# Patient Record
Sex: MALE | Race: WHITE | Employment: FULL TIME | URBAN - METROPOLITAN AREA
[De-identification: names, ages, dates, MRNs, and addresses within clinical notes are randomized per-mention and may not be internally consistent; named-entity substitution may affect disease eponyms.]

---

## 2017-02-02 RX ORDER — PAROXETINE 10 MG/1
TABLET, FILM COATED ORAL
Qty: 90 TABLET | Refills: 0 | Status: SHIPPED | OUTPATIENT
Start: 2017-02-02 | End: 2017-04-26

## 2017-02-02 RX ORDER — BENAZEPRIL HYDROCHLORIDE AND HYDROCHLOROTHIAZIDE 20; 12.5 MG/1; MG/1
TABLET ORAL
Qty: 90 TABLET | Refills: 0 | Status: SHIPPED | OUTPATIENT
Start: 2017-02-02 | End: 2017-06-14

## 2017-04-27 NOTE — TELEPHONE ENCOUNTER
Hypertensive Medications  Protocol Criteria:  · Appointment scheduled in the past 6 months or in the next 3 months  · BMP or CMP in the past 12 months  · Creatinine result < 2  Recent Visits       Provider Department Primary Dx    8 months ago Annelise Khan

## 2017-05-02 RX ORDER — PAROXETINE 10 MG/1
TABLET, FILM COATED ORAL
Qty: 30 TABLET | Refills: 0 | Status: SHIPPED | OUTPATIENT
Start: 2017-05-02 | End: 2017-06-14

## 2017-05-02 RX ORDER — BENAZEPRIL HYDROCHLORIDE AND HYDROCHLOROTHIAZIDE 20; 12.5 MG/1; MG/1
TABLET ORAL
Qty: 30 TABLET | Refills: 0 | Status: SHIPPED | OUTPATIENT
Start: 2017-05-02 | End: 2017-06-14

## 2017-06-14 ENCOUNTER — OFFICE VISIT (OUTPATIENT)
Dept: FAMILY MEDICINE CLINIC | Facility: CLINIC | Age: 42
End: 2017-06-14

## 2017-06-14 VITALS
HEART RATE: 109 BPM | TEMPERATURE: 98 F | BODY MASS INDEX: 35.79 KG/M2 | WEIGHT: 250 LBS | DIASTOLIC BLOOD PRESSURE: 84 MMHG | HEIGHT: 70 IN | SYSTOLIC BLOOD PRESSURE: 134 MMHG | RESPIRATION RATE: 20 BRPM

## 2017-06-14 DIAGNOSIS — F41.9 ANXIETY: ICD-10-CM

## 2017-06-14 DIAGNOSIS — I10 ESSENTIAL HYPERTENSION: ICD-10-CM

## 2017-06-14 PROCEDURE — 99213 OFFICE O/P EST LOW 20 MIN: CPT | Performed by: FAMILY MEDICINE

## 2017-06-14 PROCEDURE — 99212 OFFICE O/P EST SF 10 MIN: CPT | Performed by: FAMILY MEDICINE

## 2017-06-14 RX ORDER — DEXTROAMPHETAMINE SACCHARATE, AMPHETAMINE ASPARTATE, DEXTROAMPHETAMINE SULFATE AND AMPHETAMINE SULFATE 5; 5; 5; 5 MG/1; MG/1; MG/1; MG/1
20 TABLET ORAL DAILY
Qty: 30 TABLET | Refills: 0 | Status: SHIPPED | OUTPATIENT
Start: 2017-06-14 | End: 2018-09-10

## 2017-06-14 RX ORDER — PAROXETINE 10 MG/1
TABLET, FILM COATED ORAL
Qty: 30 TABLET | Refills: 0 | Status: SHIPPED | OUTPATIENT
Start: 2017-06-14 | End: 2017-06-14

## 2017-06-14 RX ORDER — PAROXETINE 10 MG/1
TABLET, FILM COATED ORAL
Qty: 90 TABLET | Refills: 3 | Status: SHIPPED | OUTPATIENT
Start: 2017-06-14 | End: 2018-06-18

## 2017-06-14 RX ORDER — BENAZEPRIL HYDROCHLORIDE AND HYDROCHLOROTHIAZIDE 20; 12.5 MG/1; MG/1
1 TABLET ORAL
Qty: 90 TABLET | Refills: 3 | Status: SHIPPED | OUTPATIENT
Start: 2017-06-14 | End: 2018-09-10

## 2017-06-14 RX ORDER — BENAZEPRIL HYDROCHLORIDE AND HYDROCHLOROTHIAZIDE 20; 12.5 MG/1; MG/1
TABLET ORAL
Qty: 90 TABLET | Refills: 0 | Status: SHIPPED | OUTPATIENT
Start: 2017-06-14 | End: 2018-08-23

## 2017-06-14 NOTE — PROGRESS NOTES
HPI:    Patient ID: Margo Singh is a 40year old male. HPI Comments: Patient is here for follow up for chronic medical issues- anxiety and HTN. The patient is compliant with medications and no side effects.  There are no acute issues and patient is re Vitals reviewed. ASSESSMENT/PLAN:   Essential hypertension:  - Stable, Will check lab work as below; Medication reviewed and renewed. Follow up and further management after testing. Routine follow up in 6-12 months or as needed.      Anxiety:

## 2018-06-14 ENCOUNTER — PATIENT OUTREACH (OUTPATIENT)
Dept: CASE MANAGEMENT | Age: 43
End: 2018-06-14

## 2018-06-18 RX ORDER — PAROXETINE 10 MG/1
TABLET, FILM COATED ORAL
Qty: 90 TABLET | Refills: 0 | Status: SHIPPED | OUTPATIENT
Start: 2018-06-18 | End: 2018-09-06

## 2018-06-26 RX ORDER — PAROXETINE 10 MG/1
TABLET, FILM COATED ORAL
Qty: 30 TABLET | Refills: 0 | OUTPATIENT
Start: 2018-06-26

## 2018-08-10 ENCOUNTER — PATIENT OUTREACH (OUTPATIENT)
Dept: CASE MANAGEMENT | Age: 43
End: 2018-08-10

## 2018-08-10 NOTE — PROGRESS NOTES
Second outreach to patient in regards to scheduling his/her HTN F/U appt. Left message to call back ext. [phone]. Thank you.

## 2018-08-25 RX ORDER — BENAZEPRIL HYDROCHLORIDE AND HYDROCHLOROTHIAZIDE 20; 12.5 MG/1; MG/1
TABLET ORAL
Qty: 90 TABLET | Refills: 0 | Status: SHIPPED | OUTPATIENT
Start: 2018-08-25 | End: 2018-09-10

## 2018-08-27 NOTE — PROGRESS NOTES
Patient due for his HTN F/U visit with his PCP. After multiple attempts to reach patient by phone a letter was sent to patient requesting a call back to discuss.

## 2018-08-29 RX ORDER — BENAZEPRIL HYDROCHLORIDE AND HYDROCHLOROTHIAZIDE 20; 12.5 MG/1; MG/1
TABLET ORAL
Qty: 90 TABLET | Refills: 1 | OUTPATIENT
Start: 2018-08-29

## 2018-08-30 NOTE — TELEPHONE ENCOUNTER
CSS, please contact patient and assist in scheduling overdue f/u or Px (LOV= 6/14/17). [facility] had already sent refill on 8/25/18 but pt needs to be seen before further refills are given. MD Nilton Sutton Angelique, RN; [facility] 2 days ago     He needs an apt for any refills.  May get 10 tablets or so max (Routing comment)

## 2018-09-06 RX ORDER — PAROXETINE 10 MG/1
TABLET, FILM COATED ORAL
Qty: 90 TABLET | Refills: 0 | Status: SHIPPED | OUTPATIENT
Start: 2018-09-06 | End: 2018-09-10

## 2018-09-06 NOTE — TELEPHONE ENCOUNTER
Refill Protocol Appointment Criteria  · Appointment scheduled in the past 6 months or in the next 3 months  Recent Outpatient Visits            1 year ago Essential hypertension    Marah Mchugh, [facility]

## 2018-09-06 NOTE — TELEPHONE ENCOUNTER
Per pt, he has an appt already with [facility] but he is out of med,  Pt needs refill on his PAROXETINE HCL 10 MG Oral Tab and his pharmacy wouldn't want to give him any emergency refill,  Pls advise.       Current Outpatient Prescriptions:        PAROXETINE HCL 1

## 2018-09-10 ENCOUNTER — OFFICE VISIT (OUTPATIENT)
Dept: FAMILY MEDICINE CLINIC | Facility: CLINIC | Age: 43
End: 2018-09-10

## 2018-09-10 VITALS
SYSTOLIC BLOOD PRESSURE: 122 MMHG | RESPIRATION RATE: 18 BRPM | BODY MASS INDEX: 34.65 KG/M2 | HEIGHT: 70 IN | WEIGHT: 242 LBS | TEMPERATURE: 98 F | DIASTOLIC BLOOD PRESSURE: 81 MMHG | HEART RATE: 83 BPM

## 2018-09-10 DIAGNOSIS — I10 ESSENTIAL HYPERTENSION: Primary | ICD-10-CM

## 2018-09-10 DIAGNOSIS — F98.8 ATTENTION DEFICIT DISORDER, UNSPECIFIED HYPERACTIVITY PRESENCE: ICD-10-CM

## 2018-09-10 DIAGNOSIS — F41.1 ANXIETY STATE: ICD-10-CM

## 2018-09-10 PROCEDURE — 99212 OFFICE O/P EST SF 10 MIN: CPT | Performed by: FAMILY MEDICINE

## 2018-09-10 PROCEDURE — 99214 OFFICE O/P EST MOD 30 MIN: CPT | Performed by: FAMILY MEDICINE

## 2018-09-10 RX ORDER — BENAZEPRIL HYDROCHLORIDE AND HYDROCHLOROTHIAZIDE 20; 12.5 MG/1; MG/1
1 TABLET ORAL
Qty: 90 TABLET | Refills: 3 | Status: SHIPPED | OUTPATIENT
Start: 2018-09-10 | End: 2019-10-08

## 2018-09-10 RX ORDER — DEXTROAMPHETAMINE SACCHARATE, AMPHETAMINE ASPARTATE, DEXTROAMPHETAMINE SULFATE AND AMPHETAMINE SULFATE 5; 5; 5; 5 MG/1; MG/1; MG/1; MG/1
20 TABLET ORAL DAILY
Qty: 30 TABLET | Refills: 0 | Status: SHIPPED | OUTPATIENT
Start: 2018-09-10 | End: 2019-01-10

## 2018-09-10 RX ORDER — PAROXETINE HYDROCHLORIDE 20 MG/1
20 TABLET, FILM COATED ORAL DAILY
Qty: 90 TABLET | Refills: 3 | Status: SHIPPED | OUTPATIENT
Start: 2018-09-10 | End: 2019-10-05

## 2018-09-10 NOTE — PROGRESS NOTES
HPI:    Patient ID: Remigio Herrera is a 44year old male. Patient is here for follow up for chronic medical issues- HTN, ADD, and anxiety. The patient is compliant with medications and no side effects.  There are no acute issues and patient is requesting management after testing. To monitor blood pressure; To call if any persistent elevation of blood pressure; Discussed good diet/activity; Routine follow up in 6-12 months or as needed.      Attention deficit disorder, unspecified hyperactivity presence:  -

## 2018-10-11 NOTE — TELEPHONE ENCOUNTER
Pt is requesting refill         Current Outpatient Medications:  amphetamine-dextroamphetamine (ADDERALL) 20 MG Oral Tab Take 1 tablet (20 mg total) by mouth daily.  Disp: 30 tablet Rfl: 0

## 2018-10-12 RX ORDER — DEXTROAMPHETAMINE SACCHARATE, AMPHETAMINE ASPARTATE, DEXTROAMPHETAMINE SULFATE AND AMPHETAMINE SULFATE 5; 5; 5; 5 MG/1; MG/1; MG/1; MG/1
20 TABLET ORAL DAILY
Qty: 30 TABLET | Refills: 0 | Status: SHIPPED | OUTPATIENT
Start: 2018-11-10 | End: 2018-12-10

## 2018-10-12 RX ORDER — DEXTROAMPHETAMINE SACCHARATE, AMPHETAMINE ASPARTATE, DEXTROAMPHETAMINE SULFATE AND AMPHETAMINE SULFATE 5; 5; 5; 5 MG/1; MG/1; MG/1; MG/1
20 TABLET ORAL DAILY
Qty: 30 TABLET | Refills: 0 | Status: SHIPPED | OUTPATIENT
Start: 2018-10-12 | End: 2018-11-11

## 2018-10-12 RX ORDER — DEXTROAMPHETAMINE SACCHARATE, AMPHETAMINE ASPARTATE, DEXTROAMPHETAMINE SULFATE AND AMPHETAMINE SULFATE 5; 5; 5; 5 MG/1; MG/1; MG/1; MG/1
20 TABLET ORAL DAILY
Qty: 30 TABLET | Refills: 0 | Status: SHIPPED | OUTPATIENT
Start: 2018-12-10 | End: 2019-01-09

## 2018-10-12 NOTE — TELEPHONE ENCOUNTER
Message noted: Chart reviewed and may refill medication as requested times 3. Script printed and left at  here at [place]. Please notify patient.

## 2018-10-12 NOTE — TELEPHONE ENCOUNTER
adderall refill request LR 9/10/18  #30      Recent Outpatient Visits            1 month ago Essential hypertension    Gabbi Singer MD    Office Visit    1 year ago Essential hypertension    [facility]

## 2018-11-12 NOTE — TELEPHONE ENCOUNTER
Pt would like a refill on adderall 20 milligrams medication. Please, call pt when the script is ready for . Current Outpatient Medications:  amphetamine-dextroamphetamine (ADDERALL) 20 MG Oral Tab Take 1 tablet (20 mg total) by mouth daily.  Maurisio Reyez

## 2018-11-13 RX ORDER — DEXTROAMPHETAMINE SACCHARATE, AMPHETAMINE ASPARTATE, DEXTROAMPHETAMINE SULFATE AND AMPHETAMINE SULFATE 5; 5; 5; 5 MG/1; MG/1; MG/1; MG/1
20 TABLET ORAL DAILY
Qty: 30 TABLET | Refills: 0 | OUTPATIENT
Start: 2018-11-13

## 2018-11-13 RX ORDER — DEXTROAMPHETAMINE SACCHARATE, AMPHETAMINE ASPARTATE, DEXTROAMPHETAMINE SULFATE AND AMPHETAMINE SULFATE 5; 5; 5; 5 MG/1; MG/1; MG/1; MG/1
20 TABLET ORAL DAILY
Qty: 30 TABLET | Refills: 0 | OUTPATIENT
Start: 2018-11-13 | End: 2018-12-13

## 2018-11-13 RX ORDER — DEXTROAMPHETAMINE SACCHARATE, AMPHETAMINE ASPARTATE, DEXTROAMPHETAMINE SULFATE AND AMPHETAMINE SULFATE 5; 5; 5; 5 MG/1; MG/1; MG/1; MG/1
20 TABLET ORAL DAILY
Qty: 30 TABLET | Refills: 0 | OUTPATIENT
Start: 2018-12-10 | End: 2019-01-09

## 2018-11-14 NOTE — TELEPHONE ENCOUNTER
Controlled medication pending for review. If approved needs to be called in or faxed by on-site staff.      Requested Prescriptions     Pending Prescriptions Disp Refills   • amphetamine-dextroamphetamine (ADDERALL) 20 MG Oral Tab 30 tablet 0     Sig: Take

## 2018-11-14 NOTE — TELEPHONE ENCOUNTER
Notified patient 3 scripts were given to his wife who picked up medication 9/10. Patient verbalized understanding.

## 2018-11-25 RX ORDER — BENAZEPRIL HYDROCHLORIDE AND HYDROCHLOROTHIAZIDE 20; 12.5 MG/1; MG/1
TABLET ORAL
Qty: 90 TABLET | Refills: 0 | OUTPATIENT
Start: 2018-11-25

## 2018-11-25 NOTE — TELEPHONE ENCOUNTER
RN=please call patient, last refilled on 9/10/18 and good for a year but it was esent to [facility] but the pharmacy requesting now is [facility], if patient changed her pharmacy, tell her to transfer the prescription.

## 2018-11-26 RX ORDER — BENAZEPRIL HYDROCHLORIDE AND HYDROCHLOROTHIAZIDE 20; 12.5 MG/1; MG/1
TABLET ORAL
Qty: 90 TABLET | Refills: 3 | Status: SHIPPED | OUTPATIENT
Start: 2018-11-26 | End: 2019-10-08

## 2018-11-26 NOTE — TELEPHONE ENCOUNTER
Please review; protocol failed.     Hypertensive Medications  Protocol Criteria:  · Appointment scheduled in the past 6 months or in the next 3 months  · BMP or CMP in the past 12 months  · Creatinine result < 2  Recent Outpatient Visits            2 months

## 2018-11-26 NOTE — TELEPHONE ENCOUNTER
BENAZEPRIL-HYDROCHLOROTHIAZIDE 20-12.5 MG Oral Tab 90 tablet 3 11/26/2018     Sig: TAKE 1 TABLET BY MOUTH EVERY DAY    Sent to pharmacy as: BENAZEPRIL-HYDROCHLOROTHIAZIDE 20-12.5 MG Oral Tab    E-Prescribing Status: Receipt confirmed by pharmacy (11/26/2

## 2019-01-10 RX ORDER — DEXTROAMPHETAMINE SACCHARATE, AMPHETAMINE ASPARTATE, DEXTROAMPHETAMINE SULFATE AND AMPHETAMINE SULFATE 5; 5; 5; 5 MG/1; MG/1; MG/1; MG/1
20 TABLET ORAL DAILY
Qty: 30 TABLET | Refills: 0 | Status: SHIPPED | OUTPATIENT
Start: 2019-01-10 | End: 2019-02-13

## 2019-01-10 NOTE — TELEPHONE ENCOUNTER
Pt is requesting refill . Asking for 3 months. amphetamine-dextroamphetamine (ADDERALL) 20 MG Oral Tab Take 1 tablet (20 mg total) by mouth daily.  Disp: 30 tablet Rfl: 0

## 2019-01-10 NOTE — TELEPHONE ENCOUNTER
Message noted: Chart reviewed and may refill medication as requested times one. Script printed and left at  here at [facility]. Please notify patient.

## 2019-01-10 NOTE — TELEPHONE ENCOUNTER
LR 9/10/18  LOV 9/10/18      Recent Visits  Date Type Provider Dept   09/10/18 Office Visit Wai Montague MD [facility]   Showing recent visits within past 540 days with a meds authorizing provider and meeting all other requirements     Future Appo

## 2019-02-07 ENCOUNTER — NURSE TRIAGE (OUTPATIENT)
Dept: OTHER | Age: 44
End: 2019-02-07

## 2019-02-07 NOTE — TELEPHONE ENCOUNTER
Action Requested: Summary for Provider     []  Critical Lab, Recommendations Needed  [] Need Additional Advice  []   FYI    []   Need Orders  [] Need Medications Sent to Pharmacy  []  Other     SUMMARY: sent to IC. Please note DR Ojeda.      Reason for call:

## 2019-02-07 NOTE — TELEPHONE ENCOUNTER
Message noted. If sig symptoms should go to ER if unable to go to immediate care- otherwise can see if someone has appointment in am to see patient.

## 2019-02-07 NOTE — TELEPHONE ENCOUNTER
Spouse calling in (present with patient), patient is not wanting to go to IC not covered under insurance, concerned with the costs, as well as costs with going to ED.  Spouse reports patient using otc pain medication, icing and resting, patient reports he i

## 2019-02-08 ENCOUNTER — HOSPITAL ENCOUNTER (OUTPATIENT)
Dept: GENERAL RADIOLOGY | Facility: HOSPITAL | Age: 44
Discharge: HOME | End: 2019-02-08
Attending: FAMILY MEDICINE
Payer: COMMERCIAL

## 2019-02-08 ENCOUNTER — OFFICE VISIT (OUTPATIENT)
Dept: FAMILY MEDICINE CLINIC | Facility: CLINIC | Age: 44
End: 2019-02-08

## 2019-02-08 VITALS
SYSTOLIC BLOOD PRESSURE: 137 MMHG | WEIGHT: 242 LBS | DIASTOLIC BLOOD PRESSURE: 90 MMHG | HEIGHT: 70 IN | HEART RATE: 79 BPM | BODY MASS INDEX: 34.65 KG/M2 | TEMPERATURE: 98 F

## 2019-02-08 DIAGNOSIS — M25.561 RIGHT KNEE PAIN, UNSPECIFIED CHRONICITY: Primary | ICD-10-CM

## 2019-02-08 DIAGNOSIS — M25.561 RIGHT KNEE PAIN, UNSPECIFIED CHRONICITY: ICD-10-CM

## 2019-02-08 PROCEDURE — 73562 X-RAY EXAM OF KNEE 3: CPT | Performed by: FAMILY MEDICINE

## 2019-02-08 PROCEDURE — 99213 OFFICE O/P EST LOW 20 MIN: CPT | Performed by: FAMILY MEDICINE

## 2019-02-08 PROCEDURE — 99212 OFFICE O/P EST SF 10 MIN: CPT | Performed by: FAMILY MEDICINE

## 2019-02-08 RX ORDER — TRAMADOL HYDROCHLORIDE 50 MG/1
50 TABLET ORAL EVERY 6 HOURS PRN
Qty: 30 TABLET | Refills: 0 | Status: SHIPPED | OUTPATIENT
Start: 2019-02-08 | End: 2019-07-10

## 2019-02-08 NOTE — PROGRESS NOTES
HPI:    Patient ID: Ryan Leon is a 38year old male. Patient with severe right knee pain since few days ago.  He bent the knee to help his son, did not have severe pain at the moment but now can not walk and can not bend knee at all        Review of

## 2019-02-11 ENCOUNTER — OFFICE VISIT (OUTPATIENT)
Dept: ORTHOPEDICS CLINIC | Facility: CLINIC | Age: 44
End: 2019-02-11

## 2019-02-11 ENCOUNTER — TELEPHONE (OUTPATIENT)
Dept: ORTHOPEDICS CLINIC | Facility: CLINIC | Age: 44
End: 2019-02-11

## 2019-02-11 DIAGNOSIS — M23.91 INTERNAL DERANGEMENT OF RIGHT KNEE: Primary | ICD-10-CM

## 2019-02-11 PROCEDURE — 99203 OFFICE O/P NEW LOW 30 MIN: CPT | Performed by: ORTHOPAEDIC SURGERY

## 2019-02-11 PROCEDURE — 99212 OFFICE O/P EST SF 10 MIN: CPT | Performed by: ORTHOPAEDIC SURGERY

## 2019-02-11 PROCEDURE — L1830 KO IMMOB CANVAS LONG PRE OTS: HCPCS | Performed by: ORTHOPAEDIC SURGERY

## 2019-02-11 NOTE — PROGRESS NOTES
NURSING INTAKE COMMENTS: Patient presents with:  Consult: New pt, injured right knee afting lifting son. x2weeks. Rates pain 8/10 at this time. Denies any numnbess or tingling.   Xray done 2/8/19       HPI: This 38year old male presents today with complain Problem Relation Age of Onset   • Prostate Cancer Other    • Hypertension Maternal Grandfather    • Heart Disease Maternal Grandfather        Social History    Socioeconomic History      Marital status:       Spouse name: Not on file      Number o distress but cannot bend his knee. His right knee has no warmth erythema he is tender by the anterior aspect more by the quad tendon than elsewhere but I do not appreciate an obvious effusion.   He can flex no better than 70 degrees anterior posterior Lach

## 2019-02-11 NOTE — TELEPHONE ENCOUNTER
Need order for Knee Immobilizer for R Knee - CPT CODE Y6224T8 - fax to [address] [place] [address] - fax # [phone].

## 2019-02-13 NOTE — TELEPHONE ENCOUNTER
Pt is requesting refill. amphetamine-dextroamphetamine (ADDERALL) 20 MG Oral Tab Take 1 tablet (20 mg total) by mouth daily.  Disp: 30 tablet Rfl: 0

## 2019-02-14 RX ORDER — DEXTROAMPHETAMINE SACCHARATE, AMPHETAMINE ASPARTATE, DEXTROAMPHETAMINE SULFATE AND AMPHETAMINE SULFATE 5; 5; 5; 5 MG/1; MG/1; MG/1; MG/1
20 TABLET ORAL DAILY
Qty: 30 TABLET | Refills: 0 | Status: SHIPPED | OUTPATIENT
Start: 2019-02-14 | End: 2019-03-21

## 2019-02-14 NOTE — TELEPHONE ENCOUNTER
Message noted: Chart reviewed and may refill medication as requested times one. Script printed and left at  here at [place]. Please notify patient.

## 2019-02-15 ENCOUNTER — HOSPITAL ENCOUNTER (OUTPATIENT)
Dept: MRI IMAGING | Age: 44
Discharge: HOME | End: 2019-02-15
Attending: ORTHOPAEDIC SURGERY
Payer: COMMERCIAL

## 2019-02-15 DIAGNOSIS — M23.91 INTERNAL DERANGEMENT OF RIGHT KNEE: ICD-10-CM

## 2019-02-15 PROCEDURE — 73721 MRI JNT OF LWR EXTRE W/O DYE: CPT | Performed by: ORTHOPAEDIC SURGERY

## 2019-02-18 ENCOUNTER — OFFICE VISIT (OUTPATIENT)
Dept: ORTHOPEDICS CLINIC | Facility: CLINIC | Age: 44
End: 2019-02-18

## 2019-02-18 DIAGNOSIS — S76.111A STRAIN OF QUADRICEPS TENDON, RIGHT, INITIAL ENCOUNTER: Primary | ICD-10-CM

## 2019-02-18 PROCEDURE — 99212 OFFICE O/P EST SF 10 MIN: CPT | Performed by: ORTHOPAEDIC SURGERY

## 2019-02-18 PROCEDURE — 99213 OFFICE O/P EST LOW 20 MIN: CPT | Performed by: ORTHOPAEDIC SURGERY

## 2019-02-18 NOTE — PROGRESS NOTES
NURSING INTAKE COMMENTS: Patient presents with:  Knee Pain: f/u on Right knee MRI. Per pt states pain has improved. Rates pain 3/10.       HPI: This 38year old male presents today with complaints of anterior right knee discomfort that is better than when I Take 1 tablet (20 mg total) by mouth daily. Disp: 90 tablet Rfl: 3   Benazepril-Hydrochlorothiazide 20-12.5 MG Oral Tab Take 1 tablet by mouth once daily. Disp: 90 tablet Rfl: 3     No current facility-administered medications on file prior to visit.      Tegan Ventura coffee, 2 cups daily        Occupational Exposure: Not Asked        Hobby Hazards: Not Asked        Sleep Concern: Not Asked        Stress Concern: Not Asked        Weight Concern: Not Asked        Special Diet: Not Asked        Back Care: Not Asked

## 2019-02-20 ENCOUNTER — TELEPHONE (OUTPATIENT)
Dept: FAMILY MEDICINE CLINIC | Facility: CLINIC | Age: 44
End: 2019-02-20

## 2019-03-11 ENCOUNTER — OFFICE VISIT (OUTPATIENT)
Dept: ORTHOPEDICS CLINIC | Facility: CLINIC | Age: 44
End: 2019-03-11

## 2019-03-11 VITALS — BODY MASS INDEX: 32 KG/M2 | WEIGHT: 220 LBS

## 2019-03-11 DIAGNOSIS — S76.111A STRAIN OF QUADRICEPS TENDON, RIGHT, INITIAL ENCOUNTER: Primary | ICD-10-CM

## 2019-03-11 PROCEDURE — 99212 OFFICE O/P EST SF 10 MIN: CPT | Performed by: ORTHOPAEDIC SURGERY

## 2019-03-11 NOTE — PROGRESS NOTES
Patient presents for follow-up doing dramatically better he denies any significant pain. He has been wearing the knee immobilizer for 3 weeks and there is no pain.   This individual had a small incomplete partial tear on the anterior most aspect of the reid

## 2019-03-22 NOTE — TELEPHONE ENCOUNTER
No Protocol on this med. Controlled medication pending for review. If approved pls call pt for p/u. Donovan Zamora thanks       Requested Prescriptions     Pending Prescriptions Disp Refills   • amphetamine-dextroamphetamine (ADDERALL) 20 MG Oral Tab 30 tablet 0

## 2019-03-23 RX ORDER — DEXTROAMPHETAMINE SACCHARATE, AMPHETAMINE ASPARTATE, DEXTROAMPHETAMINE SULFATE AND AMPHETAMINE SULFATE 5; 5; 5; 5 MG/1; MG/1; MG/1; MG/1
20 TABLET ORAL DAILY
Qty: 30 TABLET | Refills: 0 | Status: SHIPPED | OUTPATIENT
Start: 2019-03-23 | End: 2019-05-29

## 2019-04-15 ENCOUNTER — OFFICE VISIT (OUTPATIENT)
Dept: ORTHOPEDICS CLINIC | Facility: CLINIC | Age: 44
End: 2019-04-15

## 2019-04-15 DIAGNOSIS — S76.111A STRAIN OF QUADRICEPS TENDON, RIGHT, INITIAL ENCOUNTER: Primary | ICD-10-CM

## 2019-04-15 PROCEDURE — 99212 OFFICE O/P EST SF 10 MIN: CPT | Performed by: ORTHOPAEDIC SURGERY

## 2019-04-15 PROCEDURE — 99213 OFFICE O/P EST LOW 20 MIN: CPT | Performed by: ORTHOPAEDIC SURGERY

## 2019-04-15 NOTE — PROGRESS NOTES
NURSING INTAKE COMMENTS: Patient presents with: Follow - Up: right knee- pt states he feels 99% better. HPI: This 38year old male presents today with complaints of no knee pain at the present time. He states he is 98% better.   He wore the immobili insecurity:        Worry: Not on file        Inability: Not on file      Transportation needs:        Medical: Not on file        Non-medical: Not on file    Tobacco Use      Smoking status: Never Smoker      Smokeless tobacco: Never Used    Substance and reviewed     Physical Exam: He is alert oriented well-groomed oriented x3 no obvious acute distress he walks with a normal gait.   His right quadriceps has no atrophy he has no tenderness at the quad tendon he can do a straight leg raise without discomfort

## 2019-04-24 RX ORDER — DEXTROAMPHETAMINE SACCHARATE, AMPHETAMINE ASPARTATE, DEXTROAMPHETAMINE SULFATE AND AMPHETAMINE SULFATE 5; 5; 5; 5 MG/1; MG/1; MG/1; MG/1
20 TABLET ORAL DAILY
Qty: 30 TABLET | Refills: 0 | Status: CANCELLED | OUTPATIENT
Start: 2019-06-23 | End: 2019-07-23

## 2019-04-24 RX ORDER — DEXTROAMPHETAMINE SACCHARATE, AMPHETAMINE ASPARTATE, DEXTROAMPHETAMINE SULFATE AND AMPHETAMINE SULFATE 5; 5; 5; 5 MG/1; MG/1; MG/1; MG/1
20 TABLET ORAL DAILY
Qty: 30 TABLET | Refills: 0 | Status: SHIPPED | OUTPATIENT
Start: 2019-04-24 | End: 2019-05-24

## 2019-04-24 RX ORDER — DEXTROAMPHETAMINE SACCHARATE, AMPHETAMINE ASPARTATE, DEXTROAMPHETAMINE SULFATE AND AMPHETAMINE SULFATE 5; 5; 5; 5 MG/1; MG/1; MG/1; MG/1
20 TABLET ORAL DAILY
Qty: 30 TABLET | Refills: 0 | Status: CANCELLED | OUTPATIENT
Start: 2019-05-24 | End: 2019-06-23

## 2019-04-24 NOTE — TELEPHONE ENCOUNTER
Message noted: Chart reviewed and may refill medication as requested times one. Script printed and left at  here at [place]. Please notify patient.

## 2019-04-24 NOTE — TELEPHONE ENCOUNTER
Patient requesting refill for     amphetamine-dextroamphetamine (ADDERALL) 20 MG Oral Tab Take 1 tablet (20 mg total) by mouth daily. Disp: 30 tablet Rfl: 0     Patient is out of medication.

## 2019-04-24 NOTE — TELEPHONE ENCOUNTER
Controlled medication pending for review. If approved needs to be called in or faxed by on-site staff.     Last Rx: 3/23/19  LOV: 2/8/19

## 2019-05-28 NOTE — TELEPHONE ENCOUNTER
Pt is requesting refill      amphetamine-dextroamphetamine (ADDERALL) 20 MG Oral Tab Take 1 tablet (20 mg total) by mouth daily.  Disp: 30 tablet Rfl: 0

## 2019-05-30 RX ORDER — DEXTROAMPHETAMINE SACCHARATE, AMPHETAMINE ASPARTATE, DEXTROAMPHETAMINE SULFATE AND AMPHETAMINE SULFATE 5; 5; 5; 5 MG/1; MG/1; MG/1; MG/1
20 TABLET ORAL DAILY
Qty: 30 TABLET | Refills: 0 | Status: SHIPPED | OUTPATIENT
Start: 2019-05-30 | End: 2019-07-01

## 2019-05-30 NOTE — TELEPHONE ENCOUNTER
Controlled medication pending for review. If approved needs to be called in or faxed by on-site staff.     Last Rx: 3/23/19  LOV: 9/10/18    Requested Prescriptions   Pending Prescriptions Disp Refills   • amphetamine-dextroamphetamine (ADDERALL) 20 MG Ora

## 2019-05-30 NOTE — TELEPHONE ENCOUNTER
Message noted: Chart reviewed and may refill medication as requested times one. Script printed and left at  here at [facility]. Please notify patient. [place]. No concerns noted.

## 2019-07-01 RX ORDER — DEXTROAMPHETAMINE SACCHARATE, AMPHETAMINE ASPARTATE, DEXTROAMPHETAMINE SULFATE AND AMPHETAMINE SULFATE 5; 5; 5; 5 MG/1; MG/1; MG/1; MG/1
20 TABLET ORAL DAILY
Qty: 30 TABLET | Refills: 0 | Status: SHIPPED | OUTPATIENT
Start: 2019-07-01 | End: 2019-07-31

## 2019-07-01 NOTE — TELEPHONE ENCOUNTER
Call was placed to patient in regards to Rx refill.  Patient was instructed that he could  RX at the  of the [facility]

## 2019-07-01 NOTE — TELEPHONE ENCOUNTER
Pt states he need a script for medication      Current Outpatient Medications:  amphetamine-dextroamphetamine (ADDERALL) 20 MG Oral Tab Take 1 tablet (20 mg total) by mouth daily. Disp: 30 tablet Rfl: 0       Per pt he is out of medication.      Please  Adv

## 2019-07-12 NOTE — TELEPHONE ENCOUNTER
Controlled medication pending for review. If approved needs to be called in or faxed by on-site staff.     Last Rx: 2/8/19  LOV: 2/8/19

## 2019-07-13 RX ORDER — TRAMADOL HYDROCHLORIDE 50 MG/1
TABLET ORAL
Qty: 30 TABLET | Refills: 0 | OUTPATIENT
Start: 2019-07-13

## 2019-07-31 RX ORDER — DEXTROAMPHETAMINE SACCHARATE, AMPHETAMINE ASPARTATE, DEXTROAMPHETAMINE SULFATE AND AMPHETAMINE SULFATE 5; 5; 5; 5 MG/1; MG/1; MG/1; MG/1
20 TABLET ORAL DAILY
Qty: 30 TABLET | Refills: 0 | Status: SHIPPED | OUTPATIENT
Start: 2019-07-31 | End: 2019-08-30

## 2019-07-31 NOTE — TELEPHONE ENCOUNTER
Pt called in requesting a refill for the following medication:  Pt stated that he will be leaving [facility] on 8/2 and is requesting to have the Rx ready as soon as possible.   Please advise       Current Outpatient Medications:     •  amphetamine-dextroamphetam

## 2019-07-31 NOTE — TELEPHONE ENCOUNTER
· Last OV with Dr. Austin Garcia 9/2018 for ADD. Please advise on refill. Thanks.   Recent Outpatient Visits            3 months ago Strain of quadriceps tendon, right, initial encounter    [facility] for Raul Cannon Neila Covert, MD

## 2019-08-30 NOTE — TELEPHONE ENCOUNTER
Pt calling to request refill of medication amphetamine-dextroamphetamine (ADDERALL) 20 MG Oral Tab. He states he is out of the medication.

## 2019-08-31 RX ORDER — DEXTROAMPHETAMINE SACCHARATE, AMPHETAMINE ASPARTATE, DEXTROAMPHETAMINE SULFATE AND AMPHETAMINE SULFATE 5; 5; 5; 5 MG/1; MG/1; MG/1; MG/1
20 TABLET ORAL DAILY
Qty: 30 TABLET | Refills: 0 | Status: SHIPPED | OUTPATIENT
Start: 2019-08-31 | End: 2019-10-02

## 2019-08-31 NOTE — TELEPHONE ENCOUNTER
Message noted. Chart reviewed and may refill medication as requested. Will ask VELMA Flores to refill/ print as controlled substance as I am not in office today.

## 2019-08-31 NOTE — TELEPHONE ENCOUNTER
Message noted. Will refill medication as per Dr. Sanchez Duong instructions. Prescription was printed, signed and handed to front staff to inform pt that prescription is ready.

## 2019-08-31 NOTE — TELEPHONE ENCOUNTER
Controlled medications pending for review. If approved needs to be called in or faxed by on site staff.     Last Rx: 7-31-19 # 30  LOV: 9-10-18    Requested Prescriptions     Pending Prescriptions Disp Refills   • amphetamine-dextroamphetamine (ADDERALL) 20

## 2019-10-03 ENCOUNTER — PATIENT MESSAGE (OUTPATIENT)
Dept: FAMILY MEDICINE CLINIC | Facility: CLINIC | Age: 44
End: 2019-10-03

## 2019-10-03 RX ORDER — DEXTROAMPHETAMINE SACCHARATE, AMPHETAMINE ASPARTATE, DEXTROAMPHETAMINE SULFATE AND AMPHETAMINE SULFATE 5; 5; 5; 5 MG/1; MG/1; MG/1; MG/1
20 TABLET ORAL DAILY
Qty: 30 TABLET | Refills: 0 | OUTPATIENT
Start: 2019-10-03

## 2019-10-03 RX ORDER — DEXTROAMPHETAMINE SACCHARATE, AMPHETAMINE ASPARTATE, DEXTROAMPHETAMINE SULFATE AND AMPHETAMINE SULFATE 5; 5; 5; 5 MG/1; MG/1; MG/1; MG/1
20 TABLET ORAL DAILY
Qty: 30 TABLET | Refills: 0 | Status: SHIPPED | OUTPATIENT
Start: 2019-10-03 | End: 2019-10-30

## 2019-10-03 NOTE — TELEPHONE ENCOUNTER
From: Jeannette Erwin  To: Rhea Haley MD  Sent: 10/3/2019 8:20 AM CDT  Subject: Prescription Question    Dr Brenda Swift,   I need my adderall refilled.  [facility]. When I called your office they told me

## 2019-10-03 NOTE — TELEPHONE ENCOUNTER
Message noted: Chart reviewed and may refill adderall as requested times one. Script sent to listed pharmacy by secure method. Please notify patient to make appointment for follow up and further refills.

## 2019-10-03 NOTE — TELEPHONE ENCOUNTER
Controlled medication pending for review. Please change to phone in, fax, or print script if not being sent electronically.     Last Rx: 08/31/19  LOV: 09/10/18

## 2019-10-08 ENCOUNTER — OFFICE VISIT (OUTPATIENT)
Dept: FAMILY MEDICINE CLINIC | Facility: CLINIC | Age: 44
End: 2019-10-08

## 2019-10-08 VITALS
BODY MASS INDEX: 31.07 KG/M2 | RESPIRATION RATE: 20 BRPM | HEART RATE: 100 BPM | SYSTOLIC BLOOD PRESSURE: 130 MMHG | DIASTOLIC BLOOD PRESSURE: 85 MMHG | HEIGHT: 70 IN | WEIGHT: 217 LBS | TEMPERATURE: 98 F

## 2019-10-08 DIAGNOSIS — I10 ESSENTIAL HYPERTENSION: ICD-10-CM

## 2019-10-08 DIAGNOSIS — F98.8 ATTENTION DEFICIT DISORDER, UNSPECIFIED HYPERACTIVITY PRESENCE: ICD-10-CM

## 2019-10-08 DIAGNOSIS — Z00.00 ROUTINE PHYSICAL EXAMINATION: ICD-10-CM

## 2019-10-08 PROCEDURE — 90686 IIV4 VACC NO PRSV 0.5 ML IM: CPT | Performed by: FAMILY MEDICINE

## 2019-10-08 PROCEDURE — 99396 PREV VISIT EST AGE 40-64: CPT | Performed by: FAMILY MEDICINE

## 2019-10-08 PROCEDURE — 90471 IMMUNIZATION ADMIN: CPT | Performed by: FAMILY MEDICINE

## 2019-10-08 RX ORDER — PAROXETINE HYDROCHLORIDE 20 MG/1
TABLET, FILM COATED ORAL
Qty: 90 TABLET | Refills: 3 | Status: SHIPPED | OUTPATIENT
Start: 2019-10-08 | End: 2020-09-14

## 2019-10-08 RX ORDER — BENAZEPRIL HYDROCHLORIDE AND HYDROCHLOROTHIAZIDE 20; 12.5 MG/1; MG/1
1 TABLET ORAL
Qty: 90 TABLET | Refills: 3 | Status: SHIPPED | OUTPATIENT
Start: 2019-10-08 | End: 2020-09-14

## 2019-10-08 RX ORDER — BENAZEPRIL HYDROCHLORIDE AND HYDROCHLOROTHIAZIDE 20; 12.5 MG/1; MG/1
TABLET ORAL
Qty: 90 TABLET | Refills: 3 | OUTPATIENT
Start: 2019-10-08

## 2019-10-08 NOTE — PROGRESS NOTES
HPI:    Patient ID: Jeannette Erwin is a 38year old male. Patient is here for follow up for chronic medical issues- hypertension and ADD and check up. The patient is compliant with medications and no side effects.  There are no acute issues and patien Mouth/Throat: Oropharynx is clear and moist.   Eyes: Pupils are equal, round, and reactive to light. Conjunctivae and EOM are normal.   Neck: Normal range of motion. Neck supple. No thyromegaly present.    Cardiovascular: Normal rate, regular rhythm, norm tablet by mouth once daily.        Imaging & Referrals:  FLULAVAL INFLUENZA VACCINE QUAD PRESERVATIVE FREE 0.5 ML       #[id number]

## 2019-10-08 NOTE — TELEPHONE ENCOUNTER
Please advise regarding refill. rx discontinued today at the office visit.    Refill Protocol Appointment Criteria  · Appointment scheduled in the past 6 months or in the next 3 months  Recent Outpatient Visits            5 months ago Strain of quadriceps t

## 2019-11-02 RX ORDER — DEXTROAMPHETAMINE SACCHARATE, AMPHETAMINE ASPARTATE, DEXTROAMPHETAMINE SULFATE AND AMPHETAMINE SULFATE 5; 5; 5; 5 MG/1; MG/1; MG/1; MG/1
20 TABLET ORAL DAILY
Qty: 30 TABLET | Refills: 0 | Status: SHIPPED | OUTPATIENT
Start: 2019-11-02 | End: 2019-12-04

## 2019-11-02 NOTE — TELEPHONE ENCOUNTER
Controlled medication pending for review. Please change to Phone in , fax or print script if not being sent electronically.      Last Rx:10/3/19  LOV: 10/8/19

## 2019-11-02 NOTE — TELEPHONE ENCOUNTER
Message noted: Chart reviewed and may refill adderall as requested times one. Script sent to listed pharmacy by secure method. Please notify [place] No concerns noted.

## 2019-12-04 NOTE — TELEPHONE ENCOUNTER
Pt needs refill for Adderall med. - Pt. has run out. Current Outpatient Medications   Medication Sig Dispense Refill   • amphetamine-dextroamphetamine (ADDERALL) 20 MG Oral Tab Take 1 tablet (20 mg total) by mouth daily.  30 tablet 0   •       •       •

## 2019-12-05 RX ORDER — DEXTROAMPHETAMINE SACCHARATE, AMPHETAMINE ASPARTATE, DEXTROAMPHETAMINE SULFATE AND AMPHETAMINE SULFATE 5; 5; 5; 5 MG/1; MG/1; MG/1; MG/1
20 TABLET ORAL DAILY
Qty: 30 TABLET | Refills: 0 | Status: SHIPPED | OUTPATIENT
Start: 2019-12-05 | End: 2020-01-06

## 2019-12-05 NOTE — TELEPHONE ENCOUNTER
Controlled medication pending for review. Please change to phone in, fax, or print script if not being sent electronically.     Requested Prescriptions     Pending Prescriptions Disp Refills   • amphetamine-dextroamphetamine (ADDERALL) 20 MG Oral Tab 30 ta

## 2019-12-06 RX ORDER — DEXTROAMPHETAMINE SACCHARATE, AMPHETAMINE ASPARTATE, DEXTROAMPHETAMINE SULFATE AND AMPHETAMINE SULFATE 5; 5; 5; 5 MG/1; MG/1; MG/1; MG/1
20 TABLET ORAL DAILY
Qty: 30 TABLET | Refills: 0 | OUTPATIENT
Start: 2019-12-06

## 2020-01-03 NOTE — TELEPHONE ENCOUNTER
Patient requesting refill.  States pharmacy will not refill without Dr. Tree Shaw     amphetamine-dextroamphetamine (ADDERALL) 20

## 2020-01-04 NOTE — TELEPHONE ENCOUNTER
Controlled medication pending for review. Please change to phone in, fax, or print script if not being sent electronically.     Last Rx: 12/5/19  LOV: 10/8/19

## 2020-01-06 RX ORDER — DEXTROAMPHETAMINE SACCHARATE, AMPHETAMINE ASPARTATE, DEXTROAMPHETAMINE SULFATE AND AMPHETAMINE SULFATE 5; 5; 5; 5 MG/1; MG/1; MG/1; MG/1
20 TABLET ORAL DAILY
Qty: 30 TABLET | Refills: 0 | Status: SHIPPED | OUTPATIENT
Start: 2020-01-06 | End: 2020-05-20

## 2020-02-05 RX ORDER — DEXTROAMPHETAMINE SACCHARATE, AMPHETAMINE ASPARTATE, DEXTROAMPHETAMINE SULFATE AND AMPHETAMINE SULFATE 5; 5; 5; 5 MG/1; MG/1; MG/1; MG/1
20 TABLET ORAL DAILY
Qty: 30 TABLET | Refills: 0 | Status: SHIPPED | OUTPATIENT
Start: 2020-02-05 | End: 2020-03-06

## 2020-02-05 RX ORDER — DEXTROAMPHETAMINE SACCHARATE, AMPHETAMINE ASPARTATE, DEXTROAMPHETAMINE SULFATE AND AMPHETAMINE SULFATE 5; 5; 5; 5 MG/1; MG/1; MG/1; MG/1
20 TABLET ORAL DAILY
Qty: 30 TABLET | Refills: 0 | Status: SHIPPED | OUTPATIENT
Start: 2020-04-07 | End: 2020-05-07

## 2020-02-05 RX ORDER — DEXTROAMPHETAMINE SACCHARATE, AMPHETAMINE ASPARTATE, DEXTROAMPHETAMINE SULFATE AND AMPHETAMINE SULFATE 5; 5; 5; 5 MG/1; MG/1; MG/1; MG/1
20 TABLET ORAL DAILY
Qty: 30 TABLET | Refills: 0 | Status: CANCELLED | OUTPATIENT
Start: 2020-02-05

## 2020-02-05 RX ORDER — DEXTROAMPHETAMINE SACCHARATE, AMPHETAMINE ASPARTATE, DEXTROAMPHETAMINE SULFATE AND AMPHETAMINE SULFATE 5; 5; 5; 5 MG/1; MG/1; MG/1; MG/1
20 TABLET ORAL DAILY
Qty: 30 TABLET | Refills: 0 | Status: SHIPPED | OUTPATIENT
Start: 2020-03-07 | End: 2020-04-06

## 2020-02-05 RX ORDER — PAROXETINE HYDROCHLORIDE 20 MG/1
20 TABLET, FILM COATED ORAL
Qty: 90 TABLET | Refills: 3 | OUTPATIENT
Start: 2020-02-05

## 2020-02-05 NOTE — TELEPHONE ENCOUNTER
Per patient he needs refill on his Adderall, patient is out of medication. Current Outpatient Medications   Medication Sig Dispense Refill   • amphetamine-dextroamphetamine (ADDERALL) 20 MG Oral Tab Take 1 tablet (20 mg total) by mouth daily.  30 tablet

## 2020-02-05 NOTE — TELEPHONE ENCOUNTER
Tried calling patient, but keep ringing and then busy signal.Inform patient that there are refills available for Paroxetine. [place] message sent.       NO PROTOCOL:  Requested Prescriptions     Pending Prescriptions Disp Refills   • amphetamine-dextroamph

## 2020-05-20 ENCOUNTER — TELEPHONE (OUTPATIENT)
Dept: FAMILY MEDICINE CLINIC | Facility: CLINIC | Age: 45
End: 2020-05-20

## 2020-05-20 RX ORDER — DEXTROAMPHETAMINE SACCHARATE, AMPHETAMINE ASPARTATE, DEXTROAMPHETAMINE SULFATE AND AMPHETAMINE SULFATE 5; 5; 5; 5 MG/1; MG/1; MG/1; MG/1
20 TABLET ORAL DAILY
Qty: 30 TABLET | Refills: 0 | Status: SHIPPED | OUTPATIENT
Start: 2020-05-20 | End: 2020-06-22

## 2020-05-20 NOTE — TELEPHONE ENCOUNTER
Message noted: Chart reviewed and may refill adderall as requested times one. Script sent to listed pharmacy by secure method.  Please notify patient/ Pt notified through [facility]

## 2020-05-20 NOTE — TELEPHONE ENCOUNTER
Patient is requesting a refill. amphetamine-dextroamphetamine (ADDERALL) 20 MG Oral Tab 30 tablet 0 1/6/2020    Sig:   Take 1 tablet (20 mg total) by mouth daily.      Route: Precilla Piero Date:   1/6/2020     Order #:   [id number]

## 2020-06-20 ENCOUNTER — TELEPHONE (OUTPATIENT)
Dept: FAMILY MEDICINE CLINIC | Facility: CLINIC | Age: 45
End: 2020-06-20

## 2020-06-20 RX ORDER — DEXTROAMPHETAMINE SACCHARATE, AMPHETAMINE ASPARTATE, DEXTROAMPHETAMINE SULFATE AND AMPHETAMINE SULFATE 5; 5; 5; 5 MG/1; MG/1; MG/1; MG/1
20 TABLET ORAL DAILY
Qty: 30 TABLET | Refills: 0 | Status: CANCELLED | OUTPATIENT
Start: 2020-06-20

## 2020-06-20 NOTE — TELEPHONE ENCOUNTER
Patient is requesting refill of medication amphetamine-dextroamphetamine (ADDERALL) 20 MG Oral Tab. Patient states he is out of medication.

## 2020-06-22 RX ORDER — DEXTROAMPHETAMINE SACCHARATE, AMPHETAMINE ASPARTATE, DEXTROAMPHETAMINE SULFATE AND AMPHETAMINE SULFATE 5; 5; 5; 5 MG/1; MG/1; MG/1; MG/1
20 TABLET ORAL DAILY
Qty: 30 TABLET | Refills: 0 | Status: SHIPPED | OUTPATIENT
Start: 2020-06-22 | End: 2020-07-21

## 2020-06-22 NOTE — TELEPHONE ENCOUNTER
Message noted: Chart reviewed and may refill adderall as requested times one. Script sent to listed pharmacy by secure method.  Please notify patient/ Pt notified through [facility]

## 2020-07-06 ENCOUNTER — TELEPHONE (OUTPATIENT)
Dept: INTERNAL MEDICINE CLINIC | Facility: CLINIC | Age: 45
End: 2020-07-06

## 2020-07-06 NOTE — TELEPHONE ENCOUNTER
Message has been left for pt to call back regarding PCP on his insurance. Our records show that Dr. Kraig Miller is his PCP however he has never been seen by Dr. Kraig Miller, pt usually sees Dr. Anni Pavon. Pt needs to call his insurance company and change his PCP.

## 2020-07-20 NOTE — TELEPHONE ENCOUNTER
Called pt, NA LVM advising pt to call their insurance and have them change the PCP to current MD. Also sent out letter with same message.

## 2020-07-21 RX ORDER — DEXTROAMPHETAMINE SACCHARATE, AMPHETAMINE ASPARTATE, DEXTROAMPHETAMINE SULFATE AND AMPHETAMINE SULFATE 5; 5; 5; 5 MG/1; MG/1; MG/1; MG/1
20 TABLET ORAL DAILY
Qty: 30 TABLET | Refills: 0 | Status: SHIPPED | OUTPATIENT
Start: 2020-07-21 | End: 2020-08-20

## 2020-07-21 NOTE — TELEPHONE ENCOUNTER
Message noted: Chart reviewed and may refill adderall as requested times one. Script sent to listed pharmacy by secure method.  Please notify patient/ Pt notified through [facility]

## 2020-08-20 RX ORDER — DEXTROAMPHETAMINE SACCHARATE, AMPHETAMINE ASPARTATE, DEXTROAMPHETAMINE SULFATE AND AMPHETAMINE SULFATE 5; 5; 5; 5 MG/1; MG/1; MG/1; MG/1
20 TABLET ORAL DAILY
Qty: 30 TABLET | Refills: 0 | Status: SHIPPED | OUTPATIENT
Start: 2020-08-20 | End: 2020-09-22

## 2020-08-20 NOTE — TELEPHONE ENCOUNTER
Patient is completely out of   amphetamine-dextroamphetamine (ADDERALL) 20 MG Oral Tab 30 tablet 0 7/21/2020    Sig:   Take 1 tablet (20 mg total) by mouth daily. Route:   Oral           Please refill. Thank you.

## 2020-08-20 NOTE — TELEPHONE ENCOUNTER
Message noted: Chart reviewed and may refill adderall as requested times one. Script sent to listed pharmacy by secure method.  Please notify patient/ Pt notified through [address]

## 2020-09-14 RX ORDER — BENAZEPRIL HYDROCHLORIDE AND HYDROCHLOROTHIAZIDE 20; 12.5 MG/1; MG/1
TABLET ORAL
Qty: 90 TABLET | Refills: 0 | Status: SHIPPED | OUTPATIENT
Start: 2020-09-14 | End: 2020-11-24

## 2020-09-14 RX ORDER — PAROXETINE HYDROCHLORIDE 20 MG/1
TABLET, FILM COATED ORAL
Qty: 90 TABLET | Refills: 0 | Status: SHIPPED | OUTPATIENT
Start: 2020-09-14 | End: 2020-11-24

## 2020-09-21 NOTE — TELEPHONE ENCOUNTER
Controlled medication pending for review. Please change to phone in, fax, or print script if not being sent electronically.     Last Rx: 8.20/20  LOV: 10/9/19  Patient has next appt telemedicine 9/22/20  Please reply to pool: EM TRIAGE SUPPORT

## 2020-09-21 NOTE — TELEPHONE ENCOUNTER
Patient is requesting a refill for adderall. Virtual follow up appointment scheduled for 9/22. Please advise. Patient is out of medication.      amphetamine-dextroamphetamine (ADDERALL) 20 MG Oral Tab 30 tablet

## 2020-09-22 RX ORDER — DEXTROAMPHETAMINE SACCHARATE, AMPHETAMINE ASPARTATE, DEXTROAMPHETAMINE SULFATE AND AMPHETAMINE SULFATE 5; 5; 5; 5 MG/1; MG/1; MG/1; MG/1
20 TABLET ORAL DAILY
Qty: 30 TABLET | Refills: 0 | Status: SHIPPED | OUTPATIENT
Start: 2020-09-22 | End: 2020-10-22

## 2020-09-22 NOTE — TELEPHONE ENCOUNTER
Message noted: Chart reviewed and may refill alprazolam as requested. Script sent to listed pharmacy by secure method. [place] No concerns noted. Pt notified by [facility]

## 2020-10-03 ENCOUNTER — NURSE TRIAGE (OUTPATIENT)
Dept: FAMILY MEDICINE CLINIC | Facility: CLINIC | Age: 45
End: 2020-10-03

## 2020-10-03 NOTE — TELEPHONE ENCOUNTER
Action Requested: Summary for Provider     []  Critical Lab, Recommendations Needed  [] Need Additional Advice  []   FYI    []   Need Orders  [] Need Medications Sent to Pharmacy  []  Other     SUMMARY: patient called in with common cold symptoms, runny

## 2020-10-13 ENCOUNTER — NURSE TRIAGE (OUTPATIENT)
Dept: FAMILY MEDICINE CLINIC | Facility: CLINIC | Age: 45
End: 2020-10-13

## 2020-10-24 RX ORDER — DEXTROAMPHETAMINE SACCHARATE, AMPHETAMINE ASPARTATE, DEXTROAMPHETAMINE SULFATE AND AMPHETAMINE SULFATE 5; 5; 5; 5 MG/1; MG/1; MG/1; MG/1
20 TABLET ORAL DAILY
Qty: 30 TABLET | Refills: 0 | Status: SHIPPED | OUTPATIENT
Start: 2020-10-24 | End: 2020-11-23

## 2020-10-24 NOTE — TELEPHONE ENCOUNTER
Message noted: Chart reviewed and may refill adderall as requested times one. Script sent to listed pharmacy by secure method.  Please notify patient/ Pt notified through [facility]

## 2020-11-08 ENCOUNTER — TELEPHONE (OUTPATIENT)
Dept: INTERNAL MEDICINE CLINIC | Facility: CLINIC | Age: 45
End: 2020-11-08

## 2020-11-23 ENCOUNTER — PATIENT MESSAGE (OUTPATIENT)
Dept: FAMILY MEDICINE CLINIC | Facility: CLINIC | Age: 45
End: 2020-11-23

## 2020-11-23 RX ORDER — DEXTROAMPHETAMINE SACCHARATE, AMPHETAMINE ASPARTATE, DEXTROAMPHETAMINE SULFATE AND AMPHETAMINE SULFATE 5; 5; 5; 5 MG/1; MG/1; MG/1; MG/1
20 TABLET ORAL DAILY
Qty: 30 TABLET | Refills: 0 | Status: SHIPPED | OUTPATIENT
Start: 2020-11-23 | End: 2020-12-22

## 2020-11-23 NOTE — TELEPHONE ENCOUNTER
From: Giovanni Bustos  To: Karime Moore MD  Sent: 11/23/2020 7:38 AM CST  Subject: Prescription Question    Need an Aderall refill please.      Happy Thanksgiving     Christal Stevens

## 2020-11-23 NOTE — TELEPHONE ENCOUNTER
Message noted: Chart reviewed and may refill adderall as requested times one. Script sent to listed pharmacy by secure method.  Please notify patient/ Pt notified through [address]

## 2020-11-24 RX ORDER — BENAZEPRIL HYDROCHLORIDE AND HYDROCHLOROTHIAZIDE 20; 12.5 MG/1; MG/1
TABLET ORAL
Qty: 90 TABLET | Refills: 0 | Status: SHIPPED | OUTPATIENT
Start: 2020-11-24 | End: 2021-02-15

## 2020-11-24 RX ORDER — PAROXETINE HYDROCHLORIDE 20 MG/1
TABLET, FILM COATED ORAL
Qty: 90 TABLET | Refills: 0 | Status: SHIPPED | OUTPATIENT
Start: 2020-11-24 | End: 2021-02-15

## 2020-12-22 ENCOUNTER — PATIENT MESSAGE (OUTPATIENT)
Dept: FAMILY MEDICINE CLINIC | Facility: CLINIC | Age: 45
End: 2020-12-22

## 2020-12-22 RX ORDER — DEXTROAMPHETAMINE SACCHARATE, AMPHETAMINE ASPARTATE, DEXTROAMPHETAMINE SULFATE AND AMPHETAMINE SULFATE 5; 5; 5; 5 MG/1; MG/1; MG/1; MG/1
20 TABLET ORAL DAILY
Qty: 30 TABLET | Refills: 0 | Status: SHIPPED | OUTPATIENT
Start: 2020-12-22 | End: 2021-01-19

## 2020-12-22 NOTE — TELEPHONE ENCOUNTER
Message noted: Chart reviewed and may refill adderall as requested times one. Script sent to listed pharmacy by secure method.  Please notify patient/ Pt notified through [facility]

## 2020-12-22 NOTE — TELEPHONE ENCOUNTER
From: Joyce Rockwell  To: Ekaterina Trujillo MD  Sent: 12/22/2020 3:07 PM CST  Subject: Prescription Question    I am out of aderall tomorrow. I know Thursday is a holiday. Just a very busy time for me. If you could refill for me that would be great!     Damaris Haq

## 2021-01-18 ENCOUNTER — PATIENT MESSAGE (OUTPATIENT)
Dept: FAMILY MEDICINE CLINIC | Facility: CLINIC | Age: 46
End: 2021-01-18

## 2021-01-19 RX ORDER — DEXTROAMPHETAMINE SACCHARATE, AMPHETAMINE ASPARTATE, DEXTROAMPHETAMINE SULFATE AND AMPHETAMINE SULFATE 5; 5; 5; 5 MG/1; MG/1; MG/1; MG/1
20 TABLET ORAL DAILY
Qty: 30 TABLET | Refills: 0 | Status: SHIPPED | OUTPATIENT
Start: 2021-01-19 | End: 2021-02-17

## 2021-01-19 NOTE — TELEPHONE ENCOUNTER
Message noted: Chart reviewed and may refill adderall as requested times one. Script sent to listed pharmacy by secure method.  Please notify patient/ Pt notified through [facility]

## 2021-01-19 NOTE — TELEPHONE ENCOUNTER
Dr Ojeda=pended. From: Gennaro Kirk  To: Senthil Bneoit MD  Sent: 1/18/2021  7:24 PM CST  Subject: Prescription Question    I am out of aderall tomorrow. . Just a very busy time for me. If you could refill for me that would be great!     Thanks

## 2021-02-01 ENCOUNTER — IMMUNIZATION (OUTPATIENT)
Dept: LAB | Age: 46
End: 2021-02-01
Attending: HOSPITALIST
Payer: COMMERCIAL

## 2021-02-01 DIAGNOSIS — Z23 NEED FOR VACCINATION: Primary | ICD-10-CM

## 2021-02-01 PROCEDURE — 0001A SARSCOV2 VAC 30MCG/0.3ML IM: CPT

## 2021-02-15 RX ORDER — PAROXETINE HYDROCHLORIDE 20 MG/1
TABLET, FILM COATED ORAL
Qty: 90 TABLET | Refills: 0 | Status: SHIPPED | OUTPATIENT
Start: 2021-02-15 | End: 2021-05-06

## 2021-02-15 RX ORDER — BENAZEPRIL HYDROCHLORIDE AND HYDROCHLOROTHIAZIDE 20; 12.5 MG/1; MG/1
TABLET ORAL
Qty: 90 TABLET | Refills: 0 | Status: SHIPPED | OUTPATIENT
Start: 2021-02-15 | End: 2021-05-06

## 2021-02-15 NOTE — TELEPHONE ENCOUNTER
Message noted: Chart reviewed and may refill medications as requested times one. Prescription sent to listed pharmacy. Pharmacy to notify patient to make appointment for further refills  Pt notified through [facility] also.

## 2021-02-17 ENCOUNTER — PATIENT MESSAGE (OUTPATIENT)
Dept: FAMILY MEDICINE CLINIC | Facility: CLINIC | Age: 46
End: 2021-02-17

## 2021-02-17 RX ORDER — DEXTROAMPHETAMINE SACCHARATE, AMPHETAMINE ASPARTATE, DEXTROAMPHETAMINE SULFATE AND AMPHETAMINE SULFATE 5; 5; 5; 5 MG/1; MG/1; MG/1; MG/1
20 TABLET ORAL DAILY
Qty: 30 TABLET | Refills: 0 | Status: SHIPPED | OUTPATIENT
Start: 2021-02-17 | End: 2021-03-18

## 2021-02-17 NOTE — TELEPHONE ENCOUNTER
Message noted: Chart reviewed and may refill adderall as requested times one. Script sent to listed pharmacy by secure method.  Please notify patient/ Pt notified through [facility]

## 2021-02-17 NOTE — TELEPHONE ENCOUNTER
From: Lucila Laws  To: Nina Mike MD  Sent: 2/17/2021 12:07 PM CST  Subject: Prescription Question    I am out of aderall tomorrow. . Just a very busy time for me. If you could refill for me that would be great!     Thanks     Ting Norwood

## 2021-02-22 ENCOUNTER — IMMUNIZATION (OUTPATIENT)
Dept: LAB | Age: 46
End: 2021-02-22
Attending: HOSPITALIST
Payer: COMMERCIAL

## 2021-02-22 DIAGNOSIS — Z23 NEED FOR VACCINATION: Primary | ICD-10-CM

## 2021-02-22 PROCEDURE — 0002A SARSCOV2 VAC 30MCG/0.3ML IM: CPT

## 2021-03-17 ENCOUNTER — PATIENT MESSAGE (OUTPATIENT)
Dept: FAMILY MEDICINE CLINIC | Facility: CLINIC | Age: 46
End: 2021-03-17

## 2021-03-18 RX ORDER — DEXTROAMPHETAMINE SACCHARATE, AMPHETAMINE ASPARTATE, DEXTROAMPHETAMINE SULFATE AND AMPHETAMINE SULFATE 5; 5; 5; 5 MG/1; MG/1; MG/1; MG/1
20 TABLET ORAL DAILY
Qty: 30 TABLET | Refills: 0 | Status: SHIPPED | OUTPATIENT
Start: 2021-03-18 | End: 2021-04-21

## 2021-03-18 NOTE — TELEPHONE ENCOUNTER
Message noted: Chart reviewed and may refill adderall as requested times one. Script sent to listed pharmacy by secure method.  Please notify patient/ Pt notified through [facility]

## 2021-03-18 NOTE — TELEPHONE ENCOUNTER
DR Ojeda=pended. CSS=please call and assists-see Dr Jc Mt note below. MyChart sent. No future appointments. Last office visit was 9/22/20;  ASSESSMENT/PLAN:   Attention deficit disorder, unspecified hyperactivity presence: doing well.  No side

## 2021-04-21 ENCOUNTER — PATIENT MESSAGE (OUTPATIENT)
Dept: FAMILY MEDICINE CLINIC | Facility: CLINIC | Age: 46
End: 2021-04-21

## 2021-04-21 RX ORDER — DEXTROAMPHETAMINE SACCHARATE, AMPHETAMINE ASPARTATE, DEXTROAMPHETAMINE SULFATE AND AMPHETAMINE SULFATE 5; 5; 5; 5 MG/1; MG/1; MG/1; MG/1
20 TABLET ORAL DAILY
Qty: 30 TABLET | Refills: 0 | Status: SHIPPED | OUTPATIENT
Start: 2021-04-21 | End: 2021-05-21

## 2021-04-21 NOTE — TELEPHONE ENCOUNTER
From: Mey Brandt  To: Maris Ibarra MD  Sent: 4/21/2021 1:04 PM CDT  Subject: Prescription Question    Would you be willing to refill my Adderall prescription please?   Thanks   Abhinav Griffith

## 2021-05-06 RX ORDER — BENAZEPRIL HYDROCHLORIDE AND HYDROCHLOROTHIAZIDE 20; 12.5 MG/1; MG/1
TABLET ORAL
Qty: 90 TABLET | Refills: 1 | Status: SHIPPED | OUTPATIENT
Start: 2021-05-06 | End: 2021-10-20

## 2021-05-06 RX ORDER — PAROXETINE HYDROCHLORIDE 20 MG/1
TABLET, FILM COATED ORAL
Qty: 90 TABLET | Refills: 1 | Status: SHIPPED | OUTPATIENT
Start: 2021-05-06 | End: 2021-10-20

## 2021-05-06 NOTE — TELEPHONE ENCOUNTER
Message noted: Chart reviewed and may refill medications times one 90 day supply as requested with 1 additional refill. Prescription sent to listed pharmacy. Pharmacy to notify patient.  Pt notified through [facility]

## 2021-05-20 ENCOUNTER — PATIENT MESSAGE (OUTPATIENT)
Dept: FAMILY MEDICINE CLINIC | Facility: CLINIC | Age: 46
End: 2021-05-20

## 2021-05-21 RX ORDER — DEXTROAMPHETAMINE SACCHARATE, AMPHETAMINE ASPARTATE, DEXTROAMPHETAMINE SULFATE AND AMPHETAMINE SULFATE 5; 5; 5; 5 MG/1; MG/1; MG/1; MG/1
20 TABLET ORAL DAILY
Qty: 30 TABLET | Refills: 0 | Status: SHIPPED | OUTPATIENT
Start: 2021-05-21 | End: 2021-06-19

## 2021-06-18 ENCOUNTER — PATIENT MESSAGE (OUTPATIENT)
Dept: FAMILY MEDICINE CLINIC | Facility: CLINIC | Age: 46
End: 2021-06-18

## 2021-06-18 NOTE — TELEPHONE ENCOUNTER
From: Erendira Jameson  To: Nestor Goodpasture, MD  Sent: 6/18/2021 8:58 AM CDT  Subject: Prescription Question    I'm out of Adderall. I took my last pill today. If you could refill my prescription that would be great.     Thanks   Mellisa Loera

## 2021-06-19 RX ORDER — DEXTROAMPHETAMINE SACCHARATE, AMPHETAMINE ASPARTATE, DEXTROAMPHETAMINE SULFATE AND AMPHETAMINE SULFATE 5; 5; 5; 5 MG/1; MG/1; MG/1; MG/1
20 TABLET ORAL DAILY
Qty: 30 TABLET | Refills: 0 | Status: SHIPPED | OUTPATIENT
Start: 2021-06-19 | End: 2021-07-19

## 2021-06-19 NOTE — TELEPHONE ENCOUNTER
Message noted: Chart reviewed and may refill adderall as requested times one. Script sent to listed pharmacy by secure method.  Please notify patient/ Pt notified through [facility]

## 2021-07-18 ENCOUNTER — PATIENT MESSAGE (OUTPATIENT)
Dept: FAMILY MEDICINE CLINIC | Facility: CLINIC | Age: 46
End: 2021-07-18

## 2021-07-18 NOTE — TELEPHONE ENCOUNTER
From: Dana Cho  To: Padilla Sanders MD  Sent: 7/18/2021 3:50 PM CDT  Subject: Prescription Question    I'm out of Adderall. I took my last pill today. If you could refill my prescription that would be great.     Thanks   Michele Pelaez

## 2021-07-19 RX ORDER — DEXTROAMPHETAMINE SACCHARATE, AMPHETAMINE ASPARTATE, DEXTROAMPHETAMINE SULFATE AND AMPHETAMINE SULFATE 5; 5; 5; 5 MG/1; MG/1; MG/1; MG/1
20 TABLET ORAL DAILY
Qty: 30 TABLET | Refills: 0 | Status: SHIPPED | OUTPATIENT
Start: 2021-07-19 | End: 2021-08-20

## 2021-07-19 NOTE — TELEPHONE ENCOUNTER
Message noted: Chart reviewed and may refill medication as requested. Script sent to listed pharmacy by secure method.     Pt notified through [facility]

## 2021-09-16 ENCOUNTER — PATIENT MESSAGE (OUTPATIENT)
Dept: FAMILY MEDICINE CLINIC | Facility: CLINIC | Age: 46
End: 2021-09-16

## 2021-09-17 NOTE — TELEPHONE ENCOUNTER
Pt is calling for status of his medication refill request. Per the patient he is out of medication. Pt did schedule an appt to see Dr. Fady Edwards on 9-27-21 for his physical and refills. Pt can be reached at [phone].  Pt would like to know if his medication c

## 2021-09-17 NOTE — TELEPHONE ENCOUNTER
Pt requesting Addeerall refill. Med last filled 8/20/21 with instructions pt was to schedule appt before next refill. Please advise    [employer] message sent to pt advising him to schedule appt.

## 2021-09-17 NOTE — TELEPHONE ENCOUNTER
From: Erendira Jameson  To: Nestor Goodpasture, MD  Sent: 9/16/2021 9:00 PM CDT  Subject: Refill     [facility] Is there anyway you can refill it before Sunday? It’s one of my busy days.  If not I will skip Saturday’s pill and use

## 2021-09-18 RX ORDER — DEXTROAMPHETAMINE SACCHARATE, AMPHETAMINE ASPARTATE, DEXTROAMPHETAMINE SULFATE AND AMPHETAMINE SULFATE 5; 5; 5; 5 MG/1; MG/1; MG/1; MG/1
20 TABLET ORAL DAILY
Qty: 30 TABLET | Refills: 0 | Status: SHIPPED | OUTPATIENT
Start: 2021-09-18 | End: 2021-10-20

## 2021-10-20 ENCOUNTER — OFFICE VISIT (OUTPATIENT)
Dept: FAMILY MEDICINE CLINIC | Facility: CLINIC | Age: 46
End: 2021-10-20

## 2021-10-20 VITALS
HEIGHT: 70 IN | TEMPERATURE: 97 F | HEART RATE: 100 BPM | SYSTOLIC BLOOD PRESSURE: 137 MMHG | DIASTOLIC BLOOD PRESSURE: 60 MMHG | RESPIRATION RATE: 20 BRPM | WEIGHT: 226 LBS | BODY MASS INDEX: 32.35 KG/M2

## 2021-10-20 DIAGNOSIS — I10 ESSENTIAL HYPERTENSION: ICD-10-CM

## 2021-10-20 DIAGNOSIS — F98.8 ATTENTION DEFICIT DISORDER, UNSPECIFIED HYPERACTIVITY PRESENCE: ICD-10-CM

## 2021-10-20 DIAGNOSIS — F41.1 ANXIETY STATE: ICD-10-CM

## 2021-10-20 DIAGNOSIS — Z00.00 ROUTINE PHYSICAL EXAMINATION: Primary | ICD-10-CM

## 2021-10-20 PROCEDURE — 90686 IIV4 VACC NO PRSV 0.5 ML IM: CPT | Performed by: FAMILY MEDICINE

## 2021-10-20 PROCEDURE — 90471 IMMUNIZATION ADMIN: CPT | Performed by: FAMILY MEDICINE

## 2021-10-20 PROCEDURE — 99396 PREV VISIT EST AGE 40-64: CPT | Performed by: FAMILY MEDICINE

## 2021-10-20 PROCEDURE — 3075F SYST BP GE 130 - 139MM HG: CPT | Performed by: FAMILY MEDICINE

## 2021-10-20 PROCEDURE — 3008F BODY MASS INDEX DOCD: CPT | Performed by: FAMILY MEDICINE

## 2021-10-20 PROCEDURE — 3078F DIAST BP <80 MM HG: CPT | Performed by: FAMILY MEDICINE

## 2021-10-20 RX ORDER — PAROXETINE HYDROCHLORIDE 20 MG/1
20 TABLET, FILM COATED ORAL DAILY
Qty: 90 TABLET | Refills: 3 | Status: SHIPPED | OUTPATIENT
Start: 2021-10-20

## 2021-10-20 RX ORDER — BENAZEPRIL HYDROCHLORIDE AND HYDROCHLOROTHIAZIDE 20; 12.5 MG/1; MG/1
1 TABLET ORAL DAILY
Qty: 90 TABLET | Refills: 3 | Status: SHIPPED | OUTPATIENT
Start: 2021-10-20

## 2021-10-20 RX ORDER — DEXTROAMPHETAMINE SACCHARATE, AMPHETAMINE ASPARTATE, DEXTROAMPHETAMINE SULFATE AND AMPHETAMINE SULFATE 5; 5; 5; 5 MG/1; MG/1; MG/1; MG/1
20 TABLET ORAL DAILY
Qty: 30 TABLET | Refills: 0 | Status: SHIPPED | OUTPATIENT
Start: 2021-10-20 | End: 2021-11-17

## 2021-10-20 NOTE — PROGRESS NOTES
Subjective:   Patient ID: Patricia Archer is a 40year old male. Patient is here for routine physical exam and follow up on chronic medical issues. No acute issues. Patient has not had blood testing in a while. Diet and exercise have been fairly good. Left Ear: External ear normal.      Nose: Nose normal.      Mouth/Throat:      Mouth: Mucous membranes are moist.      Pharynx: No oropharyngeal exudate or posterior oropharyngeal erythema.    Eyes:      Conjunctiva/sclera: Conjunctivae normal.   Cardi lab work as below; Medication reviewed and renewed. Follow up and further management after testing. To monitor blood pressure; To call if any persistent elevation of blood pressure;  Discussed good diet/activity; Routine follow up in 6-12 months or as neede

## 2021-11-16 ENCOUNTER — PATIENT MESSAGE (OUTPATIENT)
Dept: FAMILY MEDICINE CLINIC | Facility: CLINIC | Age: 46
End: 2021-11-16

## 2021-11-17 RX ORDER — DEXTROAMPHETAMINE SACCHARATE, AMPHETAMINE ASPARTATE, DEXTROAMPHETAMINE SULFATE AND AMPHETAMINE SULFATE 5; 5; 5; 5 MG/1; MG/1; MG/1; MG/1
20 TABLET ORAL DAILY
Qty: 30 TABLET | Refills: 0 | Status: SHIPPED | OUTPATIENT
Start: 2021-11-17 | End: 2021-12-15

## 2021-11-17 NOTE — TELEPHONE ENCOUNTER
From: Marcos Cisneros  To: Zoe Kohli MD  Sent: 11/16/2021 9:05 PM CST  Subject: Refill     Adderall refill please. I’m out after tomorrow.  Thanks     Demetria Arizmendi

## 2021-11-28 ENCOUNTER — IMMUNIZATION (OUTPATIENT)
Dept: LAB | Facility: HOSPITAL | Age: 46
End: 2021-11-28
Attending: EMERGENCY MEDICINE
Payer: COMMERCIAL

## 2021-11-28 DIAGNOSIS — Z23 NEED FOR VACCINATION: Primary | ICD-10-CM

## 2021-11-28 PROCEDURE — 0004A SARSCOV2 VAC 30MCG/0.3ML IM: CPT

## 2021-12-14 ENCOUNTER — PATIENT MESSAGE (OUTPATIENT)
Dept: FAMILY MEDICINE CLINIC | Facility: CLINIC | Age: 46
End: 2021-12-14

## 2021-12-15 RX ORDER — DEXTROAMPHETAMINE SACCHARATE, AMPHETAMINE ASPARTATE, DEXTROAMPHETAMINE SULFATE AND AMPHETAMINE SULFATE 5; 5; 5; 5 MG/1; MG/1; MG/1; MG/1
20 TABLET ORAL DAILY
Qty: 30 TABLET | Refills: 0 | Status: SHIPPED | OUTPATIENT
Start: 2021-12-15 | End: 2022-01-12

## 2021-12-15 NOTE — TELEPHONE ENCOUNTER
From: Azalia Alfonso  To: Raina Tellez MD  Sent: 12/14/2021 5:15 PM CST  Subject: Adderall refill please     Dr Kenna Sepulveda,   Would you be willing to refill my Adderall please. My last pill is tomorrow.    Thanks   Radha Dominguez

## 2021-12-15 NOTE — TELEPHONE ENCOUNTER
Routed to Dr Bart Hernandez for advise, thanks. Requested Prescriptions     Pending Prescriptions Disp Refills   • amphetamine-dextroamphetamine (ADDERALL) 20 MG Oral Tab 30 tablet 0     Sig: Take 1 tablet (20 mg total) by mouth daily.        Last Office Visit wi

## 2022-01-12 RX ORDER — DEXTROAMPHETAMINE SACCHARATE, AMPHETAMINE ASPARTATE, DEXTROAMPHETAMINE SULFATE AND AMPHETAMINE SULFATE 5; 5; 5; 5 MG/1; MG/1; MG/1; MG/1
20 TABLET ORAL DAILY
Qty: 30 TABLET | Refills: 0 | Status: SHIPPED | OUTPATIENT
Start: 2022-01-12

## 2022-01-12 NOTE — TELEPHONE ENCOUNTER
Please review; protocol failed/no protocol    Requested Prescriptions   Pending Prescriptions Disp Refills    amphetamine-dextroamphetamine (ADDERALL) 20 MG Oral Tab 30 tablet 0     Sig: Take 1 tablet (20 mg total) by mouth daily.         There is no refill

## 2022-01-12 NOTE — TELEPHONE ENCOUNTER
----- Message from Segundo Long RN sent at 1/12/2022 11:57 AM CST -----  Regarding: FW: Medication refill      ----- Message -----  From: Lorene Ray  Sent: 1/12/2022  11:26 AM CST  To: Em Rn Triage  Subject: Medication refill

## 2022-01-12 NOTE — TELEPHONE ENCOUNTER
Message noted: Chart reviewed and may refill medication as requested. Script sent to listed pharmacy by secure method.     Pt notified through [facility]

## 2022-02-07 ENCOUNTER — PATIENT MESSAGE (OUTPATIENT)
Dept: FAMILY MEDICINE CLINIC | Facility: CLINIC | Age: 47
End: 2022-02-07

## 2022-02-08 RX ORDER — DEXTROAMPHETAMINE SACCHARATE, AMPHETAMINE ASPARTATE, DEXTROAMPHETAMINE SULFATE AND AMPHETAMINE SULFATE 5; 5; 5; 5 MG/1; MG/1; MG/1; MG/1
20 TABLET ORAL DAILY
Qty: 30 TABLET | Refills: 0 | Status: SHIPPED | OUTPATIENT
Start: 2022-02-08

## 2022-03-03 ENCOUNTER — PATIENT MESSAGE (OUTPATIENT)
Dept: FAMILY MEDICINE CLINIC | Facility: CLINIC | Age: 47
End: 2022-03-03

## 2022-03-04 RX ORDER — DEXTROAMPHETAMINE SACCHARATE, AMPHETAMINE ASPARTATE, DEXTROAMPHETAMINE SULFATE AND AMPHETAMINE SULFATE 7.5; 7.5; 7.5; 7.5 MG/1; MG/1; MG/1; MG/1
30 TABLET ORAL DAILY
Qty: 30 TABLET | Refills: 0 | Status: SHIPPED | OUTPATIENT
Start: 2022-03-04 | End: 2022-04-01

## 2022-03-05 NOTE — TELEPHONE ENCOUNTER
Message noted: Chart reviewed and may refill medication as requested. Script sent to listed pharmacy by secure method.     Pt notified through [facility]

## 2022-04-01 ENCOUNTER — PATIENT MESSAGE (OUTPATIENT)
Dept: FAMILY MEDICINE CLINIC | Facility: CLINIC | Age: 47
End: 2022-04-01

## 2022-04-01 RX ORDER — DEXTROAMPHETAMINE SACCHARATE, AMPHETAMINE ASPARTATE, DEXTROAMPHETAMINE SULFATE AND AMPHETAMINE SULFATE 7.5; 7.5; 7.5; 7.5 MG/1; MG/1; MG/1; MG/1
30 TABLET ORAL DAILY
Qty: 30 TABLET | Refills: 0 | Status: SHIPPED | OUTPATIENT
Start: 2022-04-01 | End: 2022-04-29

## 2022-04-02 NOTE — TELEPHONE ENCOUNTER
From: Izaiah Aguirre  To: Dexter Vyas MD  Sent: 4/1/2022 4:27 PM CDT  Subject: Adderall refill     Thanks Dr Jhonny Hodge for the new script for adderall. That extra 10 mg really helped. If you would be willing to send a new refill to [facility] that would be great. I think Sunday is my last pill.      Thanks   Maria Del Carmen Vieira

## 2022-04-29 ENCOUNTER — PATIENT MESSAGE (OUTPATIENT)
Dept: FAMILY MEDICINE CLINIC | Facility: CLINIC | Age: 47
End: 2022-04-29

## 2022-04-29 RX ORDER — DEXTROAMPHETAMINE SACCHARATE, AMPHETAMINE ASPARTATE, DEXTROAMPHETAMINE SULFATE AND AMPHETAMINE SULFATE 7.5; 7.5; 7.5; 7.5 MG/1; MG/1; MG/1; MG/1
30 TABLET ORAL DAILY
Qty: 30 TABLET | Refills: 0 | Status: SHIPPED | OUTPATIENT
Start: 2022-04-29

## 2022-04-30 NOTE — TELEPHONE ENCOUNTER
Message noted: Chart reviewed and may refill medication as requested. Script sent to listed pharmacy by secure method.     Pt notified through [facility]

## 2022-05-26 ENCOUNTER — PATIENT MESSAGE (OUTPATIENT)
Dept: FAMILY MEDICINE CLINIC | Facility: CLINIC | Age: 47
End: 2022-05-26

## 2022-05-27 NOTE — TELEPHONE ENCOUNTER
From: Tosin Diana  To: Nora Encarnacion MD  Sent: 5/26/2022 6:10 PM CDT  Subject: Adderall refill     Hi Dr Patsy Smith,  My last pill for my prescription is on Saturday. If you could refill that would be great. If not have a great memorial day weekend.      Thanks   Jose Alberto Navarrete

## 2022-05-27 NOTE — TELEPHONE ENCOUNTER
Please review; protocol failed/No Protcol    Requested Prescriptions   Pending Prescriptions Disp Refills    amphetamine-dextroamphetamine 30 MG Oral Tab 30 tablet 0     Sig: Take 1 tablet (30 mg total) by mouth daily.         There is no refill protocol information for this order           Recent Outpatient Visits              7 months ago Routine physical examination    Felicity Ko MD    Office Visit    1 year ago Attention deficit disorder, unspecified hyperactivity presence    Felicity Ko MD    [facility] [employer] [facility]    2 years ago Routine physical examination    Felicity Ko MD    Office Visit    3 years ago Strain of quadriceps tendon, right, initial encounter    [facility], [address], Rios Bolanos MD    Office Visit    3 years ago Strain of quadriceps tendon, right, initial encounter    [facility], Jennifer Figueredo MD    Office Visit

## 2022-05-28 RX ORDER — DEXTROAMPHETAMINE SACCHARATE, AMPHETAMINE ASPARTATE, DEXTROAMPHETAMINE SULFATE AND AMPHETAMINE SULFATE 7.5; 7.5; 7.5; 7.5 MG/1; MG/1; MG/1; MG/1
30 TABLET ORAL DAILY
Qty: 30 TABLET | Refills: 0 | Status: SHIPPED | OUTPATIENT
Start: 2022-05-28

## 2022-06-26 ENCOUNTER — PATIENT MESSAGE (OUTPATIENT)
Dept: FAMILY MEDICINE CLINIC | Facility: CLINIC | Age: 47
End: 2022-06-26

## 2022-06-27 ENCOUNTER — PATIENT MESSAGE (OUTPATIENT)
Dept: FAMILY MEDICINE CLINIC | Facility: CLINIC | Age: 47
End: 2022-06-27

## 2022-06-27 RX ORDER — DEXTROAMPHETAMINE SACCHARATE, AMPHETAMINE ASPARTATE, DEXTROAMPHETAMINE SULFATE AND AMPHETAMINE SULFATE 7.5; 7.5; 7.5; 7.5 MG/1; MG/1; MG/1; MG/1
30 TABLET ORAL DAILY
Qty: 30 TABLET | Refills: 0 | Status: SHIPPED | OUTPATIENT
Start: 2022-06-27

## 2022-07-24 ENCOUNTER — PATIENT MESSAGE (OUTPATIENT)
Dept: FAMILY MEDICINE CLINIC | Facility: CLINIC | Age: 47
End: 2022-07-24

## 2022-07-25 NOTE — TELEPHONE ENCOUNTER
From: Dominga Walden  To: Nedra Mullen MD  Sent: 7/24/2022 10:37 PM CDT  Subject: Adderall refill     I run out of Adderall tomorrow.  When you have a chance can you send a refill for me to [facility].   Thanks Dr Lupillo Ballard

## 2022-07-26 RX ORDER — DEXTROAMPHETAMINE SACCHARATE, AMPHETAMINE ASPARTATE, DEXTROAMPHETAMINE SULFATE AND AMPHETAMINE SULFATE 7.5; 7.5; 7.5; 7.5 MG/1; MG/1; MG/1; MG/1
30 TABLET ORAL DAILY
Qty: 30 TABLET | Refills: 0 | Status: CANCELLED
Start: 2022-09-26

## 2022-07-26 RX ORDER — DEXTROAMPHETAMINE SACCHARATE, AMPHETAMINE ASPARTATE, DEXTROAMPHETAMINE SULFATE AND AMPHETAMINE SULFATE 7.5; 7.5; 7.5; 7.5 MG/1; MG/1; MG/1; MG/1
30 TABLET ORAL DAILY
Qty: 30 TABLET | Refills: 0 | Status: SHIPPED | OUTPATIENT
Start: 2022-07-26

## 2022-07-26 RX ORDER — DEXTROAMPHETAMINE SACCHARATE, AMPHETAMINE ASPARTATE, DEXTROAMPHETAMINE SULFATE AND AMPHETAMINE SULFATE 7.5; 7.5; 7.5; 7.5 MG/1; MG/1; MG/1; MG/1
30 TABLET ORAL DAILY
Qty: 30 TABLET | Refills: 0 | Status: CANCELLED
Start: 2022-08-26

## 2022-08-24 ENCOUNTER — PATIENT MESSAGE (OUTPATIENT)
Dept: FAMILY MEDICINE CLINIC | Facility: CLINIC | Age: 47
End: 2022-08-24

## 2022-08-24 RX ORDER — DEXTROAMPHETAMINE SACCHARATE, AMPHETAMINE ASPARTATE, DEXTROAMPHETAMINE SULFATE AND AMPHETAMINE SULFATE 7.5; 7.5; 7.5; 7.5 MG/1; MG/1; MG/1; MG/1
30 TABLET ORAL DAILY
Qty: 30 TABLET | Refills: 0 | Status: SHIPPED | OUTPATIENT
Start: 2022-08-24

## 2022-08-24 NOTE — TELEPHONE ENCOUNTER
From: Annalise Navarro  To: Joss Herzog MD  Sent: 8/24/2022 12:20 PM CDT  Subject: Adderall refill     [facility] Lennox Snide,  I am out of Adderall. Would you be willing to send a refill to [facility] for me please.    Thanks   Michael Bautista

## 2022-08-24 NOTE — TELEPHONE ENCOUNTER
Message noted: Chart reviewed and may refill medication as requested. Script sent to listed pharmacy by secure method.     Pt notified through [facility]

## 2022-08-26 NOTE — TELEPHONE ENCOUNTER
Please review. Protocol failed / No Protocol. Requested Prescriptions   Pending Prescriptions Disp Refills    BENAZEPRIL-HYDROCHLOROTHIAZIDE 20-12.5 MG Oral Tab [Pharmacy Med Name: BENAZEPRIL-HCTZ 20-12.5 MG TAB] 90 tablet 3     Sig: TAKE 1 TABLET BY MOUTH EVERY DAY        Hypertensive Medications Protocol Failed - 8/24/2022 12:35 PM        Failed - CMP or BMP in past 6 months     No results found for this or any previous visit (from the past 4392 hour(s)).               Failed - In person appointment or virtual visit in the past 6 months       Recent Outpatient Visits              10 months ago Routine physical examination    Everton Pino MD    Office Visit    1 year ago Attention deficit disorder, unspecified hyperactivity presence    Everton Pino MD    [facility] [employer] [facility]    2 years ago Routine physical examination    Everton Pino MD    Office Visit    3 years ago Strain of quadriceps tendon, right, initial encounter    [facility] [address], Iris Carter MD    Office Visit    3 years ago Strain of quadriceps tendon, right, initial encounter    [facility], [address], Iris Carter MD    Office Visit                 Failed - GFR > 50     No results found for: [facility]              Passed - In person appointment in the past 12 or next 3 months       Recent Outpatient Visits              10 months ago Routine physical examination    Everton Pino MD    Office Visit    1 year ago Attention deficit disorder, unspecified hyperactivity presence    Everton Pino MD    [facility]    2 years ago Routine physical examination    Everton Pino MD    Office Visit    3 years ago Strain of quadriceps tendon, right, initial encounter    [facility], [address], Jayda Carter MD    Office Visit    3 years ago Strain of quadriceps tendon, right, initial encounter    [facility], [address], Jayda Carter MD    Office Visit                 Passed - Last BP reading less than 140/90     BP Readings from Last 1 Encounters:  10/20/21 : 137/60                       Recent Outpatient Visits              10 months ago Routine physical examination    Kaylah Centeno MD    Office Visit    1 year ago Attention deficit disorder, unspecified hyperactivity presence    Kayalh Centeno MD    [facility]    2 years ago Routine physical examination    Kaylah Centeno MD    Office Visit    3 years ago Strain of quadriceps tendon, right, initial encounter    [facility], [address], Ashish Hameed MD    Office Visit    3 years ago Strain of quadriceps tendon, right, initial encounter    [facility] Raul Wagner Hassell Midget, MD    Office Visit

## 2022-08-27 RX ORDER — BENAZEPRIL HYDROCHLORIDE AND HYDROCHLOROTHIAZIDE 20; 12.5 MG/1; MG/1
1 TABLET ORAL DAILY
Qty: 90 TABLET | Refills: 0 | Status: SHIPPED | OUTPATIENT
Start: 2022-08-27

## 2022-08-27 NOTE — TELEPHONE ENCOUNTER
Message noted: Chart reviewed and may refill medication as requested times one. Prescription sent to listed pharmacy. Pharmacy to notify patient to make appointment for further refills  Pt notified through [facility] also.

## 2022-09-22 ENCOUNTER — PATIENT MESSAGE (OUTPATIENT)
Dept: FAMILY MEDICINE CLINIC | Facility: CLINIC | Age: 47
End: 2022-09-22

## 2022-09-22 RX ORDER — DEXTROAMPHETAMINE SACCHARATE, AMPHETAMINE ASPARTATE, DEXTROAMPHETAMINE SULFATE AND AMPHETAMINE SULFATE 7.5; 7.5; 7.5; 7.5 MG/1; MG/1; MG/1; MG/1
30 TABLET ORAL DAILY
Qty: 30 TABLET | Refills: 0 | Status: SHIPPED | OUTPATIENT
Start: 2022-09-22

## 2022-09-22 NOTE — TELEPHONE ENCOUNTER
Message noted: Chart reviewed and may refill medication as requested. Script sent to listed pharmacy by secure method.     Pt notified through [facility]

## 2022-09-29 ENCOUNTER — OFFICE VISIT (OUTPATIENT)
Dept: FAMILY MEDICINE CLINIC | Facility: CLINIC | Age: 47
End: 2022-09-29

## 2022-09-29 VITALS
TEMPERATURE: 97 F | BODY MASS INDEX: 30.87 KG/M2 | WEIGHT: 215.63 LBS | SYSTOLIC BLOOD PRESSURE: 136 MMHG | HEART RATE: 111 BPM | DIASTOLIC BLOOD PRESSURE: 88 MMHG | HEIGHT: 70 IN

## 2022-09-29 DIAGNOSIS — Z00.00 ROUTINE PHYSICAL EXAMINATION: Primary | ICD-10-CM

## 2022-09-29 DIAGNOSIS — F98.8 ATTENTION DEFICIT DISORDER, UNSPECIFIED HYPERACTIVITY PRESENCE: ICD-10-CM

## 2022-09-29 DIAGNOSIS — I10 ESSENTIAL HYPERTENSION: ICD-10-CM

## 2022-09-29 DIAGNOSIS — F41.1 ANXIETY STATE: ICD-10-CM

## 2022-09-29 DIAGNOSIS — Z12.11 COLON CANCER SCREENING: ICD-10-CM

## 2022-09-29 PROCEDURE — 3079F DIAST BP 80-89 MM HG: CPT | Performed by: FAMILY MEDICINE

## 2022-09-29 PROCEDURE — 99396 PREV VISIT EST AGE 40-64: CPT | Performed by: FAMILY MEDICINE

## 2022-09-29 PROCEDURE — 3075F SYST BP GE 130 - 139MM HG: CPT | Performed by: FAMILY MEDICINE

## 2022-09-29 PROCEDURE — 3008F BODY MASS INDEX DOCD: CPT | Performed by: FAMILY MEDICINE

## 2022-10-16 ENCOUNTER — PATIENT MESSAGE (OUTPATIENT)
Dept: FAMILY MEDICINE CLINIC | Facility: CLINIC | Age: 47
End: 2022-10-16

## 2022-10-19 RX ORDER — DEXTROAMPHETAMINE SACCHARATE, AMPHETAMINE ASPARTATE, DEXTROAMPHETAMINE SULFATE AND AMPHETAMINE SULFATE 7.5; 7.5; 7.5; 7.5 MG/1; MG/1; MG/1; MG/1
30 TABLET ORAL DAILY
Qty: 30 TABLET | Refills: 0 | Status: SHIPPED | OUTPATIENT
Start: 2022-10-19

## 2022-10-19 NOTE — TELEPHONE ENCOUNTER
Message noted: Chart reviewed and may refill medication as requested. Script sent to listed pharmacy by secure method.     Pt notified through [facility]

## 2022-11-07 ENCOUNTER — TELEPHONE (OUTPATIENT)
Dept: FAMILY MEDICINE CLINIC | Facility: CLINIC | Age: 47
End: 2022-11-07

## 2022-11-12 RX ORDER — BENAZEPRIL HYDROCHLORIDE AND HYDROCHLOROTHIAZIDE 20; 12.5 MG/1; MG/1
TABLET ORAL
Qty: 90 TABLET | Refills: 3 | Status: SHIPPED | OUTPATIENT
Start: 2022-11-12

## 2022-11-12 NOTE — TELEPHONE ENCOUNTER
Message noted: Chart reviewed and may refill medication as requested. Prescription sent to listed pharmacy. Pharmacy to notify patient.  Pt notified through [facility]

## 2022-11-17 RX ORDER — DEXTROAMPHETAMINE SACCHARATE, AMPHETAMINE ASPARTATE, DEXTROAMPHETAMINE SULFATE AND AMPHETAMINE SULFATE 7.5; 7.5; 7.5; 7.5 MG/1; MG/1; MG/1; MG/1
30 TABLET ORAL DAILY
Qty: 30 TABLET | Refills: 0 | Status: SHIPPED | OUTPATIENT
Start: 2022-11-17

## 2022-11-17 RX ORDER — BENAZEPRIL HYDROCHLORIDE AND HYDROCHLOROTHIAZIDE 20; 12.5 MG/1; MG/1
1 TABLET ORAL DAILY
Qty: 90 TABLET | Refills: 3 | Status: SHIPPED | OUTPATIENT
Start: 2022-11-17

## 2022-11-17 NOTE — TELEPHONE ENCOUNTER
Message noted: Chart reviewed and may refill medications as requested. Script sent to listed pharmacy by secure method.     Pt notified through [facility]

## 2022-12-14 RX ORDER — DEXTROAMPHETAMINE SACCHARATE, AMPHETAMINE ASPARTATE, DEXTROAMPHETAMINE SULFATE AND AMPHETAMINE SULFATE 7.5; 7.5; 7.5; 7.5 MG/1; MG/1; MG/1; MG/1
30 TABLET ORAL DAILY
Qty: 30 TABLET | Refills: 0 | Status: CANCELLED | OUTPATIENT
Start: 2022-12-14

## 2022-12-15 RX ORDER — DEXTROAMPHETAMINE SACCHARATE, AMPHETAMINE ASPARTATE, DEXTROAMPHETAMINE SULFATE AND AMPHETAMINE SULFATE 7.5; 7.5; 7.5; 7.5 MG/1; MG/1; MG/1; MG/1
30 TABLET ORAL DAILY
Qty: 30 TABLET | Refills: 0 | Status: SHIPPED | OUTPATIENT
Start: 2022-12-15

## 2022-12-15 NOTE — TELEPHONE ENCOUNTER
Message noted: Chart reviewed and may refill medication as requested. Script sent to listed pharmacy by secure method.     Pt notified through [facility]

## 2023-01-14 RX ORDER — DEXTROAMPHETAMINE SACCHARATE, AMPHETAMINE ASPARTATE, DEXTROAMPHETAMINE SULFATE AND AMPHETAMINE SULFATE 7.5; 7.5; 7.5; 7.5 MG/1; MG/1; MG/1; MG/1
30 TABLET ORAL DAILY
Qty: 30 TABLET | Refills: 0 | Status: SHIPPED | OUTPATIENT
Start: 2023-01-14

## 2023-01-14 RX ORDER — BENAZEPRIL HYDROCHLORIDE AND HYDROCHLOROTHIAZIDE 20; 12.5 MG/1; MG/1
1 TABLET ORAL DAILY
Qty: 90 TABLET | Refills: 3 | Status: SHIPPED | OUTPATIENT
Start: 2023-01-14

## 2023-01-14 NOTE — TELEPHONE ENCOUNTER
Message noted: Chart reviewed and may refill medication as requested. Script sent to listed pharmacy by secure method.     Pt notified through [facility]

## 2023-02-10 NOTE — TELEPHONE ENCOUNTER
Please review. Protocol failed / No protocol. Requested Prescriptions   Pending Prescriptions Disp Refills    amphetamine-dextroamphetamine 30 MG Oral Tab 30 tablet 0     Sig: Take 1 tablet (30 mg total) by mouth daily.        There is no refill protocol information for this order              Recent Outpatient Visits              4 months ago Routine physical examination    Bill Rubin MD    Office Visit    1 year ago Routine physical examination    Bill Rubin MD    Office Visit    2 years ago Attention deficit disorder, unspecified hyperactivity presence    Bill Rubin MD    [employer] [facility]    3 years ago Routine physical examination    Bill Rubin MD    Office Visit    3 years ago Strain of quadriceps tendon, right, initial encounter    [employer], [address], Juany Carter MD    Office Visit

## 2023-02-11 RX ORDER — DEXTROAMPHETAMINE SACCHARATE, AMPHETAMINE ASPARTATE, DEXTROAMPHETAMINE SULFATE AND AMPHETAMINE SULFATE 7.5; 7.5; 7.5; 7.5 MG/1; MG/1; MG/1; MG/1
30 TABLET ORAL DAILY
Qty: 30 TABLET | Refills: 0 | Status: SHIPPED | OUTPATIENT
Start: 2023-02-11

## 2023-02-11 NOTE — TELEPHONE ENCOUNTER
Message noted: Chart reviewed and may refill medication as requested. Script sent to listed pharmacy by secure method.     Pt notified through [facility]

## 2023-03-09 RX ORDER — DEXTROAMPHETAMINE SACCHARATE, AMPHETAMINE ASPARTATE, DEXTROAMPHETAMINE SULFATE AND AMPHETAMINE SULFATE 7.5; 7.5; 7.5; 7.5 MG/1; MG/1; MG/1; MG/1
30 TABLET ORAL DAILY
Qty: 30 TABLET | Refills: 0 | Status: SHIPPED | OUTPATIENT
Start: 2023-03-09

## 2023-03-09 NOTE — TELEPHONE ENCOUNTER
Please review. Protocol failed / No Protocol. Requested Prescriptions   Pending Prescriptions Disp Refills    amphetamine-dextroamphetamine 30 MG Oral Tab 30 tablet 0     Sig: Take 1 tablet (30 mg total) by mouth daily.        There is no refill protocol information for this order

## 2023-04-03 RX ORDER — DEXTROAMPHETAMINE SACCHARATE, AMPHETAMINE ASPARTATE, DEXTROAMPHETAMINE SULFATE AND AMPHETAMINE SULFATE 7.5; 7.5; 7.5; 7.5 MG/1; MG/1; MG/1; MG/1
30 TABLET ORAL DAILY
Qty: 30 TABLET | Refills: 0 | Status: SHIPPED | OUTPATIENT
Start: 2023-04-03

## 2023-04-07 RX ORDER — DEXTROAMPHETAMINE SACCHARATE, AMPHETAMINE ASPARTATE, DEXTROAMPHETAMINE SULFATE AND AMPHETAMINE SULFATE 7.5; 7.5; 7.5; 7.5 MG/1; MG/1; MG/1; MG/1
30 TABLET ORAL DAILY
Qty: 30 TABLET | Refills: 0 | OUTPATIENT
Start: 2023-04-07

## 2023-04-07 NOTE — TELEPHONE ENCOUNTER
Patient is requesting the medication below be sent to a different pharmacy, do to low stock. .    [facility] [address][phone]    amphetamine-dextroamphetamine (ADDERALL) 20 MG Oral Tab

## 2023-04-08 NOTE — TELEPHONE ENCOUNTER
Patient called regarding status of refill. Explained it was routed to Dr. Randolph Grant to sign off.

## 2023-04-10 RX ORDER — DEXTROAMPHETAMINE SACCHARATE, AMPHETAMINE ASPARTATE, DEXTROAMPHETAMINE SULFATE AND AMPHETAMINE SULFATE 7.5; 7.5; 7.5; 7.5 MG/1; MG/1; MG/1; MG/1
30 TABLET ORAL DAILY
Qty: 30 TABLET | Refills: 0 | Status: SHIPPED | OUTPATIENT
Start: 2023-04-10

## 2023-04-10 NOTE — TELEPHONE ENCOUNTER
Message noted: Chart reviewed and may refill medication as requested. Script sent to listed pharmacy by secure method.     Pt notified through [facility]

## 2023-04-17 RX ORDER — PAROXETINE HYDROCHLORIDE 20 MG/1
20 TABLET, FILM COATED ORAL DAILY
Qty: 90 TABLET | Refills: 3 | Status: SHIPPED | OUTPATIENT
Start: 2023-04-17

## 2023-04-17 NOTE — TELEPHONE ENCOUNTER
Message noted: Chart reviewed and may refill medication as requested. Prescription sent to listed pharmacy. Pharmacy to notify patient.  Pt notified through [facility]

## 2023-04-17 NOTE — TELEPHONE ENCOUNTER
Please review.  Protocol failed/No protocol      Requested Prescriptions   Pending Prescriptions Disp Refills    PAROXETINE 20 MG Oral Tab [Pharmacy Med Name: PAROXETINE HCL 20 MG TABLET] 90 tablet 3     Sig: TAKE 1 TABLET BY MOUTH EVERY DAY       Psychiatric Non-Scheduled (Anti-Anxiety) Failed - 4/16/2023  3:28 PM        Failed - In person appointment or virtual visit in the past 6 mos or appointment in next 3 mos     Recent Outpatient Visits              6 months ago Routine physical examination    Desirae Mancilla MD    Office Visit    1 year ago Routine physical examination    Desirae Mancilla MD    Office Visit    2 years ago Attention deficit disorder, unspecified hyperactivity presence    Desirae Mancilla MD    [employer] [facility]    3 years ago Routine physical examination    Desirae Mancilla MD    Office Visit    4 years ago Strain of quadriceps tendon, right, initial encounter    [address], [address], [facility] Iris Stephens MD    Office Visit                           Recent Outpatient Visits              6 months ago Routine physical examination    Desirae Mancilla MD    Office Visit    1 year ago Routine physical examination    Desirae Mancilla MD    Office Visit    2 years ago Attention deficit disorder, unspecified hyperactivity presence    Desirae Mancilla MD    [employer] E[facility]    3 years ago Routine physical examination    Desirae Mancilla MD    Office Visit    4 years ago Strain of quadriceps tendon, right, initial encounter [address], Brooke Carter MD    Office Visit

## 2023-05-03 RX ORDER — DEXTROAMPHETAMINE SACCHARATE, AMPHETAMINE ASPARTATE, DEXTROAMPHETAMINE SULFATE AND AMPHETAMINE SULFATE 7.5; 7.5; 7.5; 7.5 MG/1; MG/1; MG/1; MG/1
30 TABLET ORAL DAILY
Qty: 30 TABLET | Refills: 0 | Status: SHIPPED | OUTPATIENT
Start: 2023-05-03

## 2023-05-03 NOTE — TELEPHONE ENCOUNTER
Please review; no protocol  Medication pended for your review and approval.    Requested Prescriptions   Pending Prescriptions Disp Refills    amphetamine-dextroamphetamine 30 MG Oral Tab 30 tablet 0     Sig: Take 1 tablet (30 mg total) by mouth daily.        There is no refill protocol information for this order

## 2023-05-12 NOTE — TELEPHONE ENCOUNTER
LM to Pt- Rx ready for  at [facility] , to call back w/ name if anyone else picking up. clear to auscultation bilaterally/no wheezes/no rales/no rhonchi/no respiratory distress

## 2023-06-01 ENCOUNTER — TELEPHONE (OUTPATIENT)
Dept: FAMILY MEDICINE CLINIC | Facility: CLINIC | Age: 48
End: 2023-06-01

## 2023-06-04 NOTE — TELEPHONE ENCOUNTER
Dr. Virginia Benoit pended to Different pharmacy: [facility]    Please advise on transfer.    ([facility])

## 2023-06-05 RX ORDER — DEXTROAMPHETAMINE SACCHARATE, AMPHETAMINE ASPARTATE, DEXTROAMPHETAMINE SULFATE AND AMPHETAMINE SULFATE 7.5; 7.5; 7.5; 7.5 MG/1; MG/1; MG/1; MG/1
30 TABLET ORAL DAILY
Qty: 30 TABLET | Refills: 0 | Status: SHIPPED | OUTPATIENT
Start: 2023-06-05 | End: 2023-07-05

## 2023-07-03 RX ORDER — DEXTROAMPHETAMINE SACCHARATE, AMPHETAMINE ASPARTATE, DEXTROAMPHETAMINE SULFATE AND AMPHETAMINE SULFATE 7.5; 7.5; 7.5; 7.5 MG/1; MG/1; MG/1; MG/1
30 TABLET ORAL DAILY
Qty: 30 TABLET | Refills: 0 | Status: SHIPPED | OUTPATIENT
Start: 2023-07-03 | End: 2023-08-02

## 2023-07-26 RX ORDER — DEXTROAMPHETAMINE SACCHARATE, AMPHETAMINE ASPARTATE, DEXTROAMPHETAMINE SULFATE AND AMPHETAMINE SULFATE 7.5; 7.5; 7.5; 7.5 MG/1; MG/1; MG/1; MG/1
30 TABLET ORAL DAILY
Qty: 30 TABLET | Refills: 0 | Status: SHIPPED | OUTPATIENT
Start: 2023-07-26 | End: 2023-08-25

## 2023-07-26 NOTE — TELEPHONE ENCOUNTER
Please review. Protocol failed / No protocol. Requested Prescriptions   Pending Prescriptions Disp Refills    amphetamine-dextroamphetamine (ADDERALL) 30 MG Oral Tab 30 tablet 0     Sig: Take 1 tablet (30 mg total) by mouth daily.        There is no refill protocol information for this order          Recent Outpatient Visits              10 months ago Routine physical examination    Sharee Paulino MD    Office Visit    1 year ago Routine physical examination    Sharee Paulino MD    Office Visit    2 years ago Attention deficit disorder, unspecified hyperactivity presence    Sharee Paulino MD    [employer] E[facility]M    3 years ago Routine physical examination    Sharee Paulino MD    Office Visit    4 years ago Strain of quadriceps tendon, right, initial encounter    [facility], [address], Arnie Carter MD    Office Visit

## 2023-08-24 RX ORDER — DEXTROAMPHETAMINE SACCHARATE, AMPHETAMINE ASPARTATE, DEXTROAMPHETAMINE SULFATE AND AMPHETAMINE SULFATE 7.5; 7.5; 7.5; 7.5 MG/1; MG/1; MG/1; MG/1
30 TABLET ORAL DAILY
Qty: 30 TABLET | Refills: 0 | Status: SHIPPED | OUTPATIENT
Start: 2023-08-24 | End: 2023-09-23

## 2023-08-24 NOTE — TELEPHONE ENCOUNTER
Message noted: Chart reviewed and may refill medication as requested. Script sent to listed pharmacy by secure method.     Pt notified through [facility]

## 2023-08-24 NOTE — TELEPHONE ENCOUNTER
Please review. Protocol Failed or has No Protocol. Requested Prescriptions   Pending Prescriptions Disp Refills    amphetamine-dextroamphetamine (ADDERALL) 30 MG Oral Tab 30 tablet 0     Sig: Take 1 tablet (30 mg total) by mouth daily.        There is no refill protocol information for this order              Recent Outpatient Visits              10 months ago Routine physical examination    Bella Govea MD    Office Visit    1 year ago Routine physical examination    Bella Govea MD    Office Visit    2 years ago Attention deficit disorder, unspecified hyperactivity presence    Bella Govea MD    [employer] E/M    3 years ago Routine physical examination    Bella Govea MD    Office Visit    4 years ago Strain of quadriceps tendon, right, initial encounter    [address], [address] [facility],[address], Tammy Carter MD    Office Visit

## 2023-09-26 RX ORDER — DEXTROAMPHETAMINE SACCHARATE, AMPHETAMINE ASPARTATE, DEXTROAMPHETAMINE SULFATE AND AMPHETAMINE SULFATE 7.5; 7.5; 7.5; 7.5 MG/1; MG/1; MG/1; MG/1
30 TABLET ORAL DAILY
Qty: 30 TABLET | Refills: 0 | Status: SHIPPED | OUTPATIENT
Start: 2023-09-26 | End: 2023-09-29

## 2023-09-30 RX ORDER — DEXTROAMPHETAMINE SACCHARATE, AMPHETAMINE ASPARTATE, DEXTROAMPHETAMINE SULFATE AND AMPHETAMINE SULFATE 7.5; 7.5; 7.5; 7.5 MG/1; MG/1; MG/1; MG/1
30 TABLET ORAL DAILY
Qty: 30 TABLET | Refills: 0 | Status: SHIPPED | OUTPATIENT
Start: 2023-09-30

## 2023-10-27 NOTE — TELEPHONE ENCOUNTER
Please review; protocol failed. Please see patients MyChart Message   avery LAYTON Em Triage [id number] hours ago (7:09 PM)       Refills have been requested for the following medications:         amphetamine-dextroamphetamine 30 MG Oral Tab [Butch Pae]      Patient Comment: Im out of this adderall prescription this weekend. Could you please send it to [facility] on [address]  again. Thanks Dr Pawel Morgan     Requested Prescriptions   Pending Prescriptions Disp Refills    amphetamine-dextroamphetamine 30 MG Oral Tab 30 tablet 0     Sig: Take 1 tablet (30 mg total) by mouth daily.        There is no refill protocol information for this order        Recent Outpatient Visits              1 year ago Routine physical examination    Francisco Enriquez MD    Office Visit    2 years ago Routine physical examination    Francisco Enriquez MD    Office Visit    3 years ago Attention deficit disorder, unspecified hyperactivity presence    Francisco Enriquez MD    [facility] [employer] E/M    4 years ago Routine physical examination    Francisco Enriquez MD    Office Visit    4 years ago Strain of quadriceps tendon, right, initial encounter    [facility], [address], Juanis Carter MD    Office Visit

## 2023-10-28 RX ORDER — DEXTROAMPHETAMINE SACCHARATE, AMPHETAMINE ASPARTATE, DEXTROAMPHETAMINE SULFATE AND AMPHETAMINE SULFATE 7.5; 7.5; 7.5; 7.5 MG/1; MG/1; MG/1; MG/1
30 TABLET ORAL DAILY
Qty: 30 TABLET | Refills: 0 | Status: SHIPPED | OUTPATIENT
Start: 2023-10-28

## 2023-10-28 NOTE — TELEPHONE ENCOUNTER
Message noted: Chart reviewed and may refill medication as requested. Script sent to listed pharmacy by secure method.     Pt notified through [facility]

## 2023-11-24 RX ORDER — DEXTROAMPHETAMINE SACCHARATE, AMPHETAMINE ASPARTATE, DEXTROAMPHETAMINE SULFATE AND AMPHETAMINE SULFATE 7.5; 7.5; 7.5; 7.5 MG/1; MG/1; MG/1; MG/1
30 TABLET ORAL DAILY
Qty: 30 TABLET | Refills: 0 | Status: SHIPPED | OUTPATIENT
Start: 2023-11-24 | End: 2023-12-19

## 2023-11-24 NOTE — TELEPHONE ENCOUNTER
Please review refill failed/no protocol - advised patient to schedule visit     Patient comment: Mina Ojeda, jean pierre paulson I haven’t made an appointment yet. My son and wife are In [place] for a treatment for his rare disease. I have been working 4 12 hour days and sometimes 14 hours then flying to [facility] to see them on the weekends. My adderall runs out on Sunday, could you please send my prescription to [facility] [address] in [place] please. Have a happy thanksgiving.       Requested Prescriptions     Pending Prescriptions Disp Refills    amphetamine-dextroamphetamine 30 MG Oral Tab 30 tablet 0     Sig: Take 1 tablet (30 mg total) by mouth daily.         Recent Visits  Date Type Provider Dept   09/29/22 Office Visit Butch Ojeda MD [facility]   Showing recent visits within past 540 days with a meds authorizing provider and meeting all other requirements  Future Appointments  No visits were found meeting these conditions.  Showing future appointments within next 150 days with a meds authorizing provider and meeting all other requirements    Requested Prescriptions   Pending Prescriptions Disp Refills    amphetamine-dextroamphetamine 30 MG Oral Tab 30 tablet 0     Sig: Take 1 tablet (30 mg total) by mouth daily.       There is no refill protocol information for this order

## 2023-12-20 RX ORDER — BENAZEPRIL HYDROCHLORIDE AND HYDROCHLOROTHIAZIDE 20; 12.5 MG/1; MG/1
1 TABLET ORAL DAILY
Qty: 90 TABLET | Refills: 0 | Status: SHIPPED | OUTPATIENT
Start: 2023-12-20 | End: 2023-12-22

## 2023-12-20 RX ORDER — DEXTROAMPHETAMINE SACCHARATE, AMPHETAMINE ASPARTATE, DEXTROAMPHETAMINE SULFATE AND AMPHETAMINE SULFATE 7.5; 7.5; 7.5; 7.5 MG/1; MG/1; MG/1; MG/1
30 TABLET ORAL DAILY
Qty: 30 TABLET | Refills: 0 | Status: SHIPPED | OUTPATIENT
Start: 2023-12-20 | End: 2024-01-22

## 2023-12-20 NOTE — TELEPHONE ENCOUNTER
Message noted: Chart reviewed and may refill medication as requested. Script sent to listed pharmacy by secure method.    Pt notified through [facility]

## 2023-12-20 NOTE — TELEPHONE ENCOUNTER
Message noted: Chart reviewed and may refill medication as requested times one. Prescription sent to listed pharmacy. Pharmacy to notify patient to make appointment for further refills  Pt notified through [facility] also.

## 2023-12-20 NOTE — TELEPHONE ENCOUNTER
Please review; protocol failed. Requested Prescriptions   Pending Prescriptions Disp Refills    Benazepril-hydroCHLOROthiazide 20-12.5 MG Oral Tab 90 tablet 3     Sig: Take 1 tablet by mouth daily. Hypertensive Medications Protocol Failed - 12/19/2023  1:16 PM        Failed - CMP or BMP in past 6 months     No results found for this or any previous visit (from the past 4392 hour(s)).             Failed - In person appointment or virtual visit in the past 6 months     Recent Outpatient Visits              1 year ago Routine physical examination    Lawrence Randhawa MD    Office Visit    2 years ago Routine physical examination    Lawrence Randhawa MD    Office Visit    3 years ago Attention deficit disorder, unspecified hyperactivity presence    Lawrence Randhawa MD    Virtual Phone E/M    4 years ago Routine physical examination    Lawrence Randhawa MD    Office Visit    4 years ago Strain of quadriceps tendon, right, initial encounter    Mariella Antunez, [address] [facility], Renate Carter MD    Office Visit          Future Appointments         Provider Department Appt Notes    In 2 weeks MD Mariella Baker, [address] [facility] Annual appointment 9/29/22               Failed - EGFRCR or GFRNAA > 50     GFR Evaluation            Passed - In person appointment in the past 12 or next 3 months     Recent Outpatient Visits              1 year ago Routine physical examination    Lawrence Randhawa MD    Office Visit    2 years ago Routine physical examination    Lawrence Randhawa MD    Office Visit    3 years ago Attention deficit disorder, unspecified hyperactivity presence    Ajit Hooker MD    Virtual Phone E/M    4 years ago Routine physical examination    Ajit Hooker MD    Office Visit    4 years ago Strain of quadriceps tendon, right, initial encounter    [address] [facility], Liv Carter MD    Office Visit          Future Appointments         Provider Department Appt Notes    In 2 weeks Hans Meigs, MD [address], [address] [facility] Annual appointment 9/29/22               Passed - Last BP reading less than 140/90     BP Readings from Last 1 Encounters:   09/29/22 136/88                    Future Appointments         Provider Department Appt Notes    In 2 weeks Hans Meigs, MD [address], [address] [facility] Annual appointment 9/29/22            Recent Outpatient Visits              1 year ago Routine physical examination    Ajit Hooker MD    Office Visit    2 years ago Routine physical examination    Ajit Hooker MD    Office Visit    3 years ago Attention deficit disorder, unspecified hyperactivity presence    Ajit Hooker MD    Virtual Phone E/M    4 years ago Routine physical examination    Ajit Hooker MD    Office Visit    4 years ago Strain of quadriceps tendon, right, initial encounter    [address], [address] [facility],[address], Liv Carter MD    Office Visit

## 2023-12-22 ENCOUNTER — TELEPHONE (OUTPATIENT)
Dept: FAMILY MEDICINE CLINIC | Facility: CLINIC | Age: 48
End: 2023-12-22

## 2023-12-22 RX ORDER — BENAZEPRIL HYDROCHLORIDE AND HYDROCHLOROTHIAZIDE 20; 12.5 MG/1; MG/1
1 TABLET ORAL DAILY
Qty: 90 TABLET | Refills: 0 | Status: SHIPPED | OUTPATIENT
Start: 2023-12-22 | End: 2024-01-22

## 2023-12-22 NOTE — TELEPHONE ENCOUNTER
Patient is calling to advise he had an emergency situation with his son out of state and requesting his blood pressure medication refill be sent to  [facility] in [place], [place] noted in file.   Please advise.      Patient does not remember the name of the blood pressure medication.

## 2023-12-22 NOTE — TELEPHONE ENCOUNTER
Resent medication below to pharmacy as requested. Canceled Rx at [facility] in [place] via voicemail.     Benazepril-hydroCHLOROthiazide 20-12.5 MG Oral Tab90 [id number]/22/2023--Sig:   Take 1 tablet by mouth daily.Route:   OralNote to Pharmacy:   Needs appointment for further refillsOrder #:   [id number]

## 2024-01-22 ENCOUNTER — OFFICE VISIT (OUTPATIENT)
Dept: FAMILY MEDICINE CLINIC | Facility: CLINIC | Age: 49
End: 2024-01-22

## 2024-01-22 VITALS
TEMPERATURE: 99 F | RESPIRATION RATE: 20 BRPM | HEIGHT: 70 IN | BODY MASS INDEX: 30.06 KG/M2 | WEIGHT: 210 LBS | HEART RATE: 105 BPM | SYSTOLIC BLOOD PRESSURE: 133 MMHG | DIASTOLIC BLOOD PRESSURE: 80 MMHG

## 2024-01-22 DIAGNOSIS — I10 ESSENTIAL HYPERTENSION: ICD-10-CM

## 2024-01-22 DIAGNOSIS — Z12.11 COLON CANCER SCREENING: ICD-10-CM

## 2024-01-22 DIAGNOSIS — Z00.00 ROUTINE PHYSICAL EXAMINATION: Primary | ICD-10-CM

## 2024-01-22 DIAGNOSIS — F98.8 ATTENTION DEFICIT DISORDER, UNSPECIFIED HYPERACTIVITY PRESENCE: ICD-10-CM

## 2024-01-22 DIAGNOSIS — F41.1 ANXIETY STATE: ICD-10-CM

## 2024-01-22 PROCEDURE — 3079F DIAST BP 80-89 MM HG: CPT | Performed by: FAMILY MEDICINE

## 2024-01-22 PROCEDURE — 3075F SYST BP GE 130 - 139MM HG: CPT | Performed by: FAMILY MEDICINE

## 2024-01-22 PROCEDURE — 99396 PREV VISIT EST AGE 40-64: CPT | Performed by: FAMILY MEDICINE

## 2024-01-22 PROCEDURE — 3008F BODY MASS INDEX DOCD: CPT | Performed by: FAMILY MEDICINE

## 2024-01-22 RX ORDER — DEXTROAMPHETAMINE SACCHARATE, AMPHETAMINE ASPARTATE, DEXTROAMPHETAMINE SULFATE AND AMPHETAMINE SULFATE 7.5; 7.5; 7.5; 7.5 MG/1; MG/1; MG/1; MG/1
30 TABLET ORAL DAILY
Qty: 30 TABLET | Refills: 0 | Status: SHIPPED | OUTPATIENT
Start: 2024-01-22

## 2024-01-22 RX ORDER — BENAZEPRIL HYDROCHLORIDE AND HYDROCHLOROTHIAZIDE 20; 12.5 MG/1; MG/1
1 TABLET ORAL DAILY
Qty: 90 TABLET | Refills: 3 | Status: SHIPPED | OUTPATIENT
Start: 2024-01-22

## 2024-01-22 NOTE — PROGRESS NOTES
Subjective:   Patient ID: Ady Sanches is a 49 year old male.    Patient is here for routine physical exam. No acute issues. Patient is requesting testing. Diet and exercise have been good.  Past medical history, family history, and social history were reviewed.  Patient is here for follow up for chronic medical issues- ADD, hypertension. The patient is compliant with medications and no side effects. There are no acute issues and patient is requesting refills. The patient states medications have been helpful and effective.  Has had some stress as wife and child have been out of the home for treatment for his son.        History/Other:   Review of Systems   Constitutional: Negative.    HENT: Negative.     Eyes: Negative.    Respiratory: Negative.     Cardiovascular: Negative.    Gastrointestinal: Negative.    Endocrine: Negative.    Genitourinary: Negative.    Musculoskeletal: Negative.    Skin: Negative.    Allergic/Immunologic: Negative.    Neurological: Negative.    Psychiatric/Behavioral: Negative.       Current Outpatient Medications   Medication Sig Dispense Refill    amphetamine-dextroamphetamine 30 MG Oral Tab Take 1 tablet (30 mg total) by mouth daily. 30 tablet 0    Benazepril-hydroCHLOROthiazide 20-12.5 MG Oral Tab Take 1 tablet by mouth daily. 90 tablet 3    PARoxetine 20 MG Oral Tab Take 1 tablet (20 mg total) by mouth daily. 90 tablet 3     Allergies:No Known Allergies    Objective:   Physical Exam  Constitutional:       Appearance: Normal appearance. He is well-developed.   HENT:      Head: Normocephalic and atraumatic.      Right Ear: External ear normal.      Left Ear: External ear normal.      Nose: Nose normal.   Eyes:      Conjunctiva/sclera: Conjunctivae normal.      Pupils: Pupils are equal, round, and reactive to light.   Cardiovascular:      Rate and Rhythm: Normal rate and regular rhythm.      Pulses: Normal pulses.      Heart sounds: Normal heart sounds.   Pulmonary:      Effort:  Pulmonary effort is normal.      Breath sounds: Normal breath sounds.   Abdominal:      General: Bowel sounds are normal.      Palpations: Abdomen is soft.   Genitourinary:     Penis: Normal.       Prostate: Normal.      Rectum: Normal.   Musculoskeletal:         General: Normal range of motion.      Cervical back: Normal range of motion and neck supple.   Skin:     General: Skin is warm and dry.   Neurological:      General: No focal deficit present.      Mental Status: He is alert and oriented to person, place, and time.      Deep Tendon Reflexes: Reflexes are normal and symmetric.   Psychiatric:         Mood and Affect: Mood normal.         Behavior: Behavior normal.         Thought Content: Thought content normal.         Judgment: Judgment normal.         Assessment & Plan:   1. Routine physical examination:  - Exam is unremarkable. Screening tests were discussed, and after discussion, will check lab work as below. Healthy diet, exercise, and weight were discussed. To call if problems and follow up and further management after testing. Routine follow up.     2. Colon cancer screening:  - After discussion, FIT testing was ordered, Follow up and further management after testing     3. Attention deficit disorder, unspecified hyperactivity presence:  - Stable: medication reviewed and renewed; To continue present treatment; To call if problems; Routine follow up in 6-12 months.     4. Essential hypertension:  - Stable, Will check lab work as below; Medication reviewed and refilled. Follow up and further management after testing. To monitor blood pressure; To call if any persistent elevation of blood pressure; Discussed good diet/activity; Routine follow up in 6-12 months or as needed.      5. Anxiety state:  - Stable: medication reviewed; To continue present treatment; To call if problems; Routine follow up in 6-12 months.         Orders Placed This Encounter   Procedures    Lipid Panel    PSA Total, Screen    CBC,  Platelet; No Differential    Comp Metabolic Panel (14)    Occult Blood, Fecal, FIT Immunoassay       Meds This Visit:  Requested Prescriptions     Signed Prescriptions Disp Refills    amphetamine-dextroamphetamine 30 MG Oral Tab 30 tablet 0     Sig: Take 1 tablet (30 mg total) by mouth daily.    Benazepril-hydroCHLOROthiazide 20-12.5 MG Oral Tab 90 tablet 3     Sig: Take 1 tablet by mouth daily.       Imaging & Referrals:  None

## 2024-02-12 NOTE — PROGRESS NOTES
HPI:    Patient ID: Ty Marcus is a 41year old male. Virtual Telephone Check-In    Ty Marcus verbally consents to a Virtual/Telephone Check-In visit on 09/22/20.   Patient has been referred to the [facility] website at [email or web address] Patient was seen on 1/29. Diagnosed with strep and prescribed an antibiotic. Finished the medication on Thursday 2/8. Saturday 2/10 the throat pain returned and mom thinks there are small dots in the back of her throat along with inflammation and redness. Please advise.    disorder, unspecified hyperactivity presence: doing well. No side efffects  - Stable: adderall reviewed and renewed; To continue present treatment; To call if problems; Routine follow up in 6-12 months.       No orders of the defined types were placed in th

## 2024-02-22 DIAGNOSIS — F98.8 ATTENTION DEFICIT DISORDER, UNSPECIFIED HYPERACTIVITY PRESENCE: ICD-10-CM

## 2024-02-24 RX ORDER — DEXTROAMPHETAMINE SACCHARATE, AMPHETAMINE ASPARTATE, DEXTROAMPHETAMINE SULFATE AND AMPHETAMINE SULFATE 7.5; 7.5; 7.5; 7.5 MG/1; MG/1; MG/1; MG/1
30 TABLET ORAL DAILY
Qty: 30 TABLET | Refills: 0 | Status: SHIPPED | OUTPATIENT
Start: 2024-02-24

## 2024-02-24 NOTE — TELEPHONE ENCOUNTER
This medication could not be e-scribed likely due to the nationwide [employer]  issue announced on 2/22/24.    Provider, please print the prescription instead.   Clinical staff, please let the patient know once the printed rx is available for  at your office.      Please review; protocol failed/no protocol.     Requested Prescriptions   Pending Prescriptions Disp Refills    amphetamine-dextroamphetamine 30 MG Oral Tab 30 tablet 0     Sig: Take 1 tablet (30 mg total) by mouth daily.       Controlled Substance Medication Failed - 2/22/2024  7:32 PM        Failed - This medication is a controlled substance - forward to provider to refill              Recent Outpatient Visits              1 month [facility]    [facility]    1 year ago [facility]ine physical [facility]    Office Visit    2 years ago Routine physical examination    [facility]    Office Visit    3 years ago Attention deficit disorder, unspecified hyperactivity presence    [facility]    Virtual Phone E/M    4 years ago Routine physical examination    [facility]    Office Visit

## 2024-02-24 NOTE — TELEPHONE ENCOUNTER
Message noted: Chart reviewed and may refill medication as requested. Script sent to listed pharmacy by secure method.    Pt notified through [employer]

## 2024-03-20 DIAGNOSIS — F98.8 ATTENTION DEFICIT DISORDER, UNSPECIFIED HYPERACTIVITY PRESENCE: ICD-10-CM

## 2024-03-22 RX ORDER — DEXTROAMPHETAMINE SACCHARATE, AMPHETAMINE ASPARTATE, DEXTROAMPHETAMINE SULFATE AND AMPHETAMINE SULFATE 7.5; 7.5; 7.5; 7.5 MG/1; MG/1; MG/1; MG/1
30 TABLET ORAL DAILY
Qty: 30 TABLET | Refills: 0 | Status: SHIPPED | OUTPATIENT
Start: 2024-03-22

## 2024-03-22 NOTE — TELEPHONE ENCOUNTER
Protocol Failed/ No Protocol    Requested Prescriptions   Pending Prescriptions Disp Refills    amphetamine-dextroamphetamine 30 MG Oral Tab 30 tablet 0     Sig: Take 1 tablet (30 mg total) by mouth daily.       Controlled Substance Medication Failed - 3/20/2024  7:39 PM        Failed - This medication is a controlled substance - forward to provider to refill               Recent Outpatient Visits              1 month ago Routine physical examination    [facility]    Office Visit    1 year ago Routine physical examination    [facility]    Office Visit    2 years ago Routine physical examination    [facility]    Office Visit    3 years ago Attention deficit disorder, unspecified hyperactivity presence    [facility]    Virtual Phone E/M    4 years ago Routine physical examination    [facility]    Office Visit

## 2024-03-22 NOTE — TELEPHONE ENCOUNTER
Message noted: Chart reviewed and may refill medication as requested. Script sent to listed pharmacy by secure method.    Pt notified through [employer]

## 2024-04-14 DIAGNOSIS — F98.8 ATTENTION DEFICIT DISORDER, UNSPECIFIED HYPERACTIVITY PRESENCE: ICD-10-CM

## 2024-04-15 RX ORDER — DEXTROAMPHETAMINE SACCHARATE, AMPHETAMINE ASPARTATE, DEXTROAMPHETAMINE SULFATE AND AMPHETAMINE SULFATE 7.5; 7.5; 7.5; 7.5 MG/1; MG/1; MG/1; MG/1
30 TABLET ORAL DAILY
Qty: 30 TABLET | Refills: 0 | Status: SHIPPED | OUTPATIENT
Start: 2024-04-20

## 2024-04-15 NOTE — TELEPHONE ENCOUNTER
Please review. Protocol failed or has no protocol.     Patient Comment: Im out sometime next week. Just getting prescription in before running out Thanks     Requested Prescriptions   Pending Prescriptions Disp Refills    amphetamine-dextroamphetamine 30 MG Oral Tab 30 tablet 0     Sig: Take 1 tablet (30 mg total) by mouth daily.       Controlled Substance Medication Failed - 4/14/2024  3:54 PM        Failed - This medication is a controlled substance - forward to provider to refill             Recent Outpatient Visits              2 months ago Routine physical examination    [facility]    [facility]    1 year ago Routine physical examination    [facility]    Office Visit    2 years ago Routine physical examination    [facility]    Office Visit    3 years ago Attention deficit disorder, unspecified hyperactivity presence    [facility]    Virtual Phone E/M    4 years ago Routine physical examination    [facility]    Office Visit

## 2024-04-15 NOTE — TELEPHONE ENCOUNTER
Message noted: Chart reviewed and may refill medication as requested. Script sent to listed pharmacy by secure method.    Pt notified through [employer]

## 2024-04-24 RX ORDER — PAROXETINE HYDROCHLORIDE 20 MG/1
20 TABLET, FILM COATED ORAL DAILY
Qty: 90 TABLET | Refills: 3 | Status: SHIPPED | OUTPATIENT
Start: 2024-04-24

## 2024-04-24 NOTE — TELEPHONE ENCOUNTER
Refill passed per [facility] protocol.    Requested Prescriptions   Pending Prescriptions Disp Refills    PAROXETINE 20 MG Oral Tab [Pharmacy Med Name: PAROXETINE HCL 20 MG TABLET] 90 tablet 3     Sig: TAKE 1 TABLET BY MOUTH EVERY DAY       Psychiatric Non-Scheduled (Anti-Anxiety) Passed - 4/23/2024 12:17 AM        Passed - In person appointment or virtual visit in the past 6 mos or appointment in next 3 mos     Recent Outpatient Visits              3 months ago Routine physical examination    [facility]    Office Visit    1 year ago Routine physical examination    [facility]    Office Visit    2 years ago Routine physical examination    [facility]    Office Visit    3 years ago Attention deficit disorder, unspecified hyperactivity presence    [facility] Nathaniel, MD    Virtual Phone E/M    4 years ago Routine physical examination    [facility]    Office Visit                      Passed - Depression Screening completed within the past 12 months             Recent Outpatient Visits              3 months ago Routine physical examination    [facility]    Office Visit    1 year ago Routine physical examination    [facility]    Office Visit    2 years ago Routine physical examination    [facility]    Office Visit    3 years ago Attention deficit disorder, unspecified hyperactivity presence    [facility] Beth Nathaniel, MD    Virtual Phone E/M    4 years ago Routine  physical examination    [facility], [place] Butch Ojeda MD    Office Visit

## 2024-05-17 DIAGNOSIS — F98.8 ATTENTION DEFICIT DISORDER, UNSPECIFIED HYPERACTIVITY PRESENCE: ICD-10-CM

## 2024-05-17 RX ORDER — BENAZEPRIL HYDROCHLORIDE AND HYDROCHLOROTHIAZIDE 20; 12.5 MG/1; MG/1
1 TABLET ORAL DAILY
Qty: 90 TABLET | Refills: 3 | Status: CANCELLED | OUTPATIENT
Start: 2024-05-17

## 2024-05-20 NOTE — TELEPHONE ENCOUNTER
Please review.  Protocol failed / Has no protocol.    Recent fills : 2/25, 3/23, 4/22  due 5/26/2024  Last prescription written: 4/20/2024  Last office visit: 1/22/2024       Requested Prescriptions   Pending Prescriptions Disp Refills    amphetamine-dextroamphetamine 30 MG Oral Tab 30 tablet 0     Sig: Take 1 tablet (30 mg total) by mouth daily.       Controlled Substance Medication Failed - 5/17/2024 10:37 AM        Failed - This medication is a controlled substance - forward to provider to refill             Recent Outpatient Visits              3 months ago [facility]    Office Visit    1 year ago Routine physical examination    [facility]    Office Visit    2 years ago Routine physical examination    [facility]    Office Visit    3 years ago Attention deficit disorder, unspecified hyperactivity presence    [facility]    Virtual Phone E/M    4 years ago Routine physical examination    [facility]    Office Visit

## 2024-05-21 RX ORDER — DEXTROAMPHETAMINE SACCHARATE, AMPHETAMINE ASPARTATE, DEXTROAMPHETAMINE SULFATE AND AMPHETAMINE SULFATE 7.5; 7.5; 7.5; 7.5 MG/1; MG/1; MG/1; MG/1
30 TABLET ORAL DAILY
Qty: 30 TABLET | Refills: 0 | Status: SHIPPED | OUTPATIENT
Start: 2024-05-26

## 2024-05-21 NOTE — TELEPHONE ENCOUNTER
Message noted: Chart reviewed and may refill medication as requested. Script sent to listed pharmacy by secure method.    Pt notified through [employer]

## 2024-05-22 ENCOUNTER — TELEPHONE (OUTPATIENT)
Dept: FAMILY MEDICINE CLINIC | Facility: CLINIC | Age: 49
End: 2024-05-22

## 2024-05-22 NOTE — TELEPHONE ENCOUNTER
Patient called requesting a refill on the following medication:      amphetamine-dextroamphetamine 30 MG Oral Tab     Per patient he is completely out of his medication. Per [employer] message from the telephone encounter on 05/17/2024 the medication refill was sent to his pharmacy already... it shows to dispense it on 05/26/2024.

## 2024-05-26 DIAGNOSIS — F98.8 ATTENTION DEFICIT DISORDER, UNSPECIFIED HYPERACTIVITY PRESENCE: ICD-10-CM

## 2024-05-29 RX ORDER — DEXTROAMPHETAMINE SACCHARATE, AMPHETAMINE ASPARTATE, DEXTROAMPHETAMINE SULFATE AND AMPHETAMINE SULFATE 7.5; 7.5; 7.5; 7.5 MG/1; MG/1; MG/1; MG/1
30 TABLET ORAL DAILY
Qty: 30 TABLET | Refills: 0 | OUTPATIENT
Start: 2024-05-29

## 2024-06-25 DIAGNOSIS — F98.8 ATTENTION DEFICIT DISORDER, UNSPECIFIED HYPERACTIVITY PRESENCE: ICD-10-CM

## 2024-06-27 RX ORDER — DEXTROAMPHETAMINE SACCHARATE, AMPHETAMINE ASPARTATE, DEXTROAMPHETAMINE SULFATE AND AMPHETAMINE SULFATE 7.5; 7.5; 7.5; 7.5 MG/1; MG/1; MG/1; MG/1
30 TABLET ORAL DAILY
Qty: 30 TABLET | Refills: 0 | Status: SHIPPED | OUTPATIENT
Start: 2024-06-27

## 2024-06-27 NOTE — TELEPHONE ENCOUNTER
Please Review. Protocol Failed; No Protocol   Requested Prescriptions     Pending Prescriptions Disp Refills    amphetamine-dextroamphetamine 30 MG Oral Tab 30 tablet 0     Sig: Take 1 tablet (30 mg total) by mouth daily.       Recent fills: Quantity: 30  05/29/2024 04/22/2024 03/23/2024                                                                    Patient is due: 06/29/2024  Last Rx written: 05/21/2024  Last Office Visit: 01/22/2024        Requested Prescriptions   Pending Prescriptions Disp Refills    amphetamine-dextroamphetamine 30 MG Oral Tab 30 tablet 0     Sig: Take 1 tablet (30 mg total) by mouth daily.       Controlled Substance Medication Failed - 6/25/2024 11:21 AM        Failed - This medication is a controlled substance - forward to provider to refill                 Recent Outpatient Visits              5 months ago Routine physical examination    [facility]    Office Visit    1 year ago Routine physical examination    [facility]    Office Visit    2 years ago Routine physical examination    [facility]    Office Visit    3 years ago Attention deficit disorder, unspecified hyperactivity presence    [facility]    Virtual Phone E/M    4 years ago Routine physical examination    [facility]    Office Visit

## 2024-06-27 NOTE — TELEPHONE ENCOUNTER
Message noted: Chart reviewed and may refill medication as requested. Script sent to listed pharmacy by secure method.    Pt notified through [employer]

## 2024-07-23 DIAGNOSIS — F98.8 ATTENTION DEFICIT DISORDER, UNSPECIFIED HYPERACTIVITY PRESENCE: ICD-10-CM

## 2024-07-25 RX ORDER — DEXTROAMPHETAMINE SACCHARATE, AMPHETAMINE ASPARTATE, DEXTROAMPHETAMINE SULFATE AND AMPHETAMINE SULFATE 7.5; 7.5; 7.5; 7.5 MG/1; MG/1; MG/1; MG/1
30 TABLET ORAL DAILY
Qty: 30 TABLET | Refills: 0 | Status: SHIPPED | OUTPATIENT
Start: 2024-07-25

## 2024-07-25 NOTE — TELEPHONE ENCOUNTER
Message noted: Chart reviewed and may refill medication as requested. Script sent to listed pharmacy by secure method.    Pt notified through [employer]

## 2024-07-25 NOTE — TELEPHONE ENCOUNTER
Per patient, he is totally out of medication,  please send to his pharmacy on file verified.  [id number] - [place], [place] - [address] AT [facility], [phone], [phone] [[id number]]

## 2024-07-25 NOTE — TELEPHONE ENCOUNTER
Please review.  Protocol failed / Has no protocol.  Marked High Priority, patient states out of medication    Recent fills : 4/29, 5/29, 6/27  Due 7/28  Last prescription written: 6/27/25  Last office visit: 1/22/2024     Requested Prescriptions   Pending Prescriptions Disp Refills    amphetamine-dextroamphetamine 30 MG Oral Tab 30 tablet 0     Sig: Take 1 tablet (30 mg total) by mouth daily.       Controlled Substance Medication Failed - 7/25/2024 12:50 PM        Failed - This medication is a controlled substance - forward to provider to refill             Recent Outpatient Visits              6 months ago [facility]    [facility]    1 year ago Routine physical examination    [facility]    Office Visit    2 years ago Routine physical examination    [facility]    Office Visit    3 years ago Attention deficit disorder, unspecified hyperactivity presence    [facility]    Virtual Phone E/M    4 years ago Routine physical examination    [facility]    Office Visit

## 2024-08-22 ENCOUNTER — TELEPHONE (OUTPATIENT)
Dept: FAMILY MEDICINE CLINIC | Facility: CLINIC | Age: 49
End: 2024-08-22

## 2024-08-22 DIAGNOSIS — F98.8 ATTENTION DEFICIT DISORDER, UNSPECIFIED HYPERACTIVITY PRESENCE: ICD-10-CM

## 2024-08-22 DIAGNOSIS — F98.8 ATTENTION DEFICIT DISORDER, UNSPECIFIED TYPE: ICD-10-CM

## 2024-08-23 RX ORDER — DEXTROAMPHETAMINE SACCHARATE, AMPHETAMINE ASPARTATE, DEXTROAMPHETAMINE SULFATE AND AMPHETAMINE SULFATE 7.5; 7.5; 7.5; 7.5 MG/1; MG/1; MG/1; MG/1
30 TABLET ORAL DAILY
Qty: 30 TABLET | Refills: 0 | OUTPATIENT
Start: 2024-08-23

## 2024-08-23 NOTE — TELEPHONE ENCOUNTER
Patient called and stated he is going out of town tomorrow and is hoping to  the medication today.

## 2024-08-23 NOTE — TELEPHONE ENCOUNTER
[employer] message sent to patient to schedule an office visit with PCP.   Please make a phone attempt.

## 2024-08-23 NOTE — TELEPHONE ENCOUNTER
Please review. Protocol Failed; No Protocol    Routing to [employer] due to High Priority status and Dr. Ojeda is out of office.      Patient is going out of town .      Recent fills: 5/29/2024, 6/27/2024, 7/25/2024  Last Rx written: 7/25/2024  Last office visit: 1/22/2024        Requested Prescriptions   Pending Prescriptions Disp Refills    amphetamine-dextroamphetamine 30 MG Oral Tab 30 tablet 0     Sig: Take 1 tablet (30 mg total) by mouth daily.       Controlled Substance Medication Failed - 8/23/2024 10:19 AM        Failed - This medication is a controlled substance - forward to provider to refill                 Recent Outpatient Visits              7 months ago Routine physical [facility]    Office Visit    1 year ago Routine physical examination    [facility]    Office Visit    2 years ago Routine physical examination    [facility]    Office Visit    3 years ago Attention deficit disorder, unspecified hyperactivity presence    [facility]    Virtual Phone E/M    4 years ago Routine physical examination    [facility]    Office Visit

## 2024-08-26 RX ORDER — DEXTROAMPHETAMINE SACCHARATE, AMPHETAMINE ASPARTATE, DEXTROAMPHETAMINE SULFATE AND AMPHETAMINE SULFATE 7.5; 7.5; 7.5; 7.5 MG/1; MG/1; MG/1; MG/1
30 TABLET ORAL DAILY
Qty: 30 TABLET | Refills: 0 | Status: SHIPPED | OUTPATIENT
Start: 2024-08-26

## 2024-08-26 NOTE — TELEPHONE ENCOUNTER
Message noted: Chart reviewed and may refill medication as requested. Script sent to listed pharmacy by secure method.    Pt notified through [employer]

## 2024-08-26 NOTE — TELEPHONE ENCOUNTER
Please review; protocol failed/ has no protocol      Message sent for patient to make an appointment.       Please see patients MyChart Message     avery Garridoarz  [facility] Refills4 days ago       Refills have been requested for the following medications:         amphetamine-dextroamphetamine 30 MG Oral Tab [Butch Ojeda]      Patient Comment: My last pill is tomorrow.       Recent fills: 07/25/2024,06/27/2024,05/29.2024  Last Rx written: 07/25/2024  Last Office Visit: 01/22/2024    Recent Visits  Date Type Provider Dept   01/22/24 Office Visit Butch Ojeda MD [facility]         Requested Prescriptions   Pending Prescriptions Disp Refills    amphetamine-dextroamphetamine 30 MG Oral Tab 30 tablet 0     Sig: Take 1 tablet (30 mg total) by mouth daily.       Controlled Substance Medication Failed - 8/26/2024  8:11 AM        Failed - This medication is a controlled substance - forward to provider to refill         Refused Prescriptions Disp Refills    amphetamine-dextroamphetamine 30 MG Oral Tab 30 tablet 0     Sig: Take 1 tablet (30 mg total) by mouth daily.       Controlled Substance Medication Failed - 8/26/2024  8:11 AM        Failed - This medication is a controlled substance - forward to provider to refill           Recent Outpatient Visits              7 months ago Routine physical examination    [facility] Nathaniel, MD    Office Visit    1 year ago Routine physical examination    [facility]    Office Visit    2 years ago Routine physical examination    [facility]    Office Visit    3 years ago Attention deficit disorder, unspecified hyperactivity presence    [facility]    Virtual Phone E/M    4 years ago Routine physical examination    [facility]    Office Visit

## 2024-08-26 NOTE — TELEPHONE ENCOUNTER
Please call patient to make an appointment and [employer] message sent,thank you.

## 2024-09-23 DIAGNOSIS — F98.8 ATTENTION DEFICIT DISORDER, UNSPECIFIED TYPE: ICD-10-CM

## 2024-09-24 NOTE — TELEPHONE ENCOUNTER
Message noted: Chart reviewed and may resend medication as requested. Script sent to listed pharmacy by secure method.     Pt notified through [facility]
Please review. Protocol failed / Has no protocol. Requested Prescriptions   Pending Prescriptions Disp Refills    amphetamine-dextroamphetamine 30 MG Oral Tab 30 tablet 0     Sig: Take 1 tablet (30 mg total) by mouth daily.        There is no refill protocol information for this order           Recent Outpatient Visits              1 year ago Routine physical examination    Aniket Magallon MD    Office Visit    1 year ago Routine physical examination    Aniket Magallon MD    Office Visit    3 years ago Attention deficit disorder, unspecified hyperactivity presence    Aniket Magallon MD    Virtual Phone E/M    3 years ago Routine physical examination    Aniket Magallon MD    Office Visit    4 years ago Strain of quadriceps tendon, right, initial encounter    [address], [address] [facility],[address], Vilma Carter MD    Office Visit
Negative

## 2024-09-25 RX ORDER — DEXTROAMPHETAMINE SACCHARATE, AMPHETAMINE ASPARTATE, DEXTROAMPHETAMINE SULFATE AND AMPHETAMINE SULFATE 7.5; 7.5; 7.5; 7.5 MG/1; MG/1; MG/1; MG/1
30 TABLET ORAL DAILY
Qty: 30 TABLET | Refills: 0 | Status: SHIPPED | OUTPATIENT
Start: 2024-09-25

## 2024-09-25 NOTE — TELEPHONE ENCOUNTER
Please review; protocol failed/No Protocol    Recent Fills: 06/27/2024, 07/25/2024, 08/26/2024    Last Rx Written: 08/26/2024    Last Office Visit: 01/22/2024  Future Appointments   Date Time Provider Department Center   1/23/2025  3:40 PM Butch Ojeda MD [facility]     Requested Prescriptions   Pending Prescriptions Disp Refills    amphetamine-dextroamphetamine 30 MG Oral Tab 30 tablet 0     Sig: Take 1 tablet (30 mg total) by mouth daily.       Controlled Substance Medication Failed - 9/23/2024  1:56 PM        Failed - This medication is a controlled substance - forward to provider to refill           Future Appointments         Provider Department Appt Notes    In 4 months Butch Ojeda MD [facility] Beth last px 01/22/24          Recent Outpatient Visits              8 months ago Routine physical examination    [facility]    Office Visit    1 year ago Routine physical examination    [facility]    Office Visit    2 years ago Routine physical examination    [facility]    Office Visit    4 years ago Attention deficit disorder, unspecified hyperactivity presence    [facility]    Virtual Phone E/M    4 years ago Routine physical examination    [facility]    Office Visit

## 2024-09-25 NOTE — TELEPHONE ENCOUNTER
Message noted: Chart reviewed and may refill medication as requested. Script sent to listed pharmacy by secure method.    Pt notified through [employer]

## 2024-10-19 DIAGNOSIS — F98.8 ATTENTION DEFICIT DISORDER, UNSPECIFIED TYPE: ICD-10-CM

## 2024-10-22 RX ORDER — DEXTROAMPHETAMINE SACCHARATE, AMPHETAMINE ASPARTATE, DEXTROAMPHETAMINE SULFATE AND AMPHETAMINE SULFATE 7.5; 7.5; 7.5; 7.5 MG/1; MG/1; MG/1; MG/1
30 TABLET ORAL DAILY
Qty: 30 TABLET | Refills: 0 | Status: SHIPPED | OUTPATIENT
Start: 2024-10-22

## 2024-10-22 NOTE — TELEPHONE ENCOUNTER
Message noted: Chart reviewed and may refill medication as requested. Script sent to listed pharmacy by secure method.    Pt notified through [employer]

## 2024-10-22 NOTE — TELEPHONE ENCOUNTER
Please review. Protocol Failed; No Protocol      Recent fills: 7/25/2024, 8/26/2024, 9/25/2024  Last Rx written: 9/25/2024  Last office visit: 1/22/2024      Future Appointments  Date Type Provider Dept   01/23/25 Appointment Butch Ojeda MD [facility]   Showing future appointments within next 150 days with a meds authorizing provider and meeting all other requirements        Requested Prescriptions   Pending Prescriptions Disp Refills    amphetamine-dextroamphetamine 30 MG Oral Tab 30 tablet 0     Sig: Take 1 tablet (30 mg total) by mouth daily.       Controlled Substance Medication Failed - 10/22/2024 11:29 AM        Failed - This medication is a controlled substance - forward to provider to refill               Future Appointments         Provider Department Appt Notes    In 3 months Butch Ojeda MD [facility] last px 01/22/24          Recent Outpatient Visits              9 months ago Routine physical examination    [facility]    Office Visit    2 years ago Routine physical examination    [facility]    Office Visit    3 years ago Routine physical examination    [facility]    Office Visit    4 years ago Attention deficit disorder, unspecified hyperactivity presence    [facility]    Virtual Phone E/M    5 years ago Routine physical examination    [facility]    Office Visit

## 2024-11-19 DIAGNOSIS — F98.8 ATTENTION DEFICIT DISORDER, UNSPECIFIED TYPE: ICD-10-CM

## 2024-11-20 RX ORDER — DEXTROAMPHETAMINE SACCHARATE, AMPHETAMINE ASPARTATE, DEXTROAMPHETAMINE SULFATE AND AMPHETAMINE SULFATE 7.5; 7.5; 7.5; 7.5 MG/1; MG/1; MG/1; MG/1
30 TABLET ORAL DAILY
Qty: 30 TABLET | Refills: 0 | Status: SHIPPED | OUTPATIENT
Start: 2024-11-20

## 2024-11-20 NOTE — TELEPHONE ENCOUNTER
Message noted: Chart reviewed and may refill medication as requested. Script sent to listed pharmacy by secure method.    Pt notified through [employer]

## 2024-12-15 DIAGNOSIS — F98.8 ATTENTION DEFICIT DISORDER, UNSPECIFIED TYPE: ICD-10-CM

## 2024-12-17 RX ORDER — DEXTROAMPHETAMINE SACCHARATE, AMPHETAMINE ASPARTATE, DEXTROAMPHETAMINE SULFATE AND AMPHETAMINE SULFATE 7.5; 7.5; 7.5; 7.5 MG/1; MG/1; MG/1; MG/1
30 TABLET ORAL DAILY
Qty: 30 TABLET | Refills: 0 | Status: SHIPPED | OUTPATIENT
Start: 2024-12-17

## 2024-12-17 NOTE — TELEPHONE ENCOUNTER
Message noted: Chart reviewed and may refill medication as requested. Script sent to listed pharmacy by secure method.    Pt notified through [employer]

## 2024-12-17 NOTE — TELEPHONE ENCOUNTER
Please review.  Protocol failed / Has no protocol.     Adderall 30mg Recent fills : 8/26, 9/25, 10/23, 11/20  DUE 12/24  Last prescription written: 11/20/24  Last office visit: 1/22/24    Future Appointments  Date Type Provider Dept   01/23/25 Appointment Butch Ojeda MD [facility]     Requested Prescriptions   Pending Prescriptions Disp Refills    amphetamine-dextroamphetamine 30 MG Oral Tab 30 tablet 0     Sig: Take 1 tablet (30 mg total) by mouth daily.       Controlled Substance Medication Failed - 12/17/2024  3:33 PM        Failed - This medication is a controlled substance - forward to provider to refill           Future Appointments         Provider Department Appt Notes    In 1 month But[facility]/22[facility]24          Recent Outpatient Visits              11 months ago Routine physical examination    [facility] Nathaniel, MD    Office Visit    2 years ago Routine physical examination    [facility] Nathaniel, MD    Office Visit    3 years ago Routine physical examination    [facility] Nathaniel, MD    Office Visit    4 years ago Attention deficit disorder, unspecified hyperactivity presence    [facility]    Virtual Phone E/M    5 years ago Routine physical examination    [facility]    Office Visit

## 2025-01-11 DIAGNOSIS — F98.8 ATTENTION DEFICIT DISORDER, UNSPECIFIED TYPE: ICD-10-CM

## 2025-01-13 RX ORDER — DEXTROAMPHETAMINE SACCHARATE, AMPHETAMINE ASPARTATE, DEXTROAMPHETAMINE SULFATE AND AMPHETAMINE SULFATE 7.5; 7.5; 7.5; 7.5 MG/1; MG/1; MG/1; MG/1
30 TABLET ORAL DAILY
Qty: 30 TABLET | Refills: 0 | Status: SHIPPED | OUTPATIENT
Start: 2025-01-13

## 2025-01-13 NOTE — TELEPHONE ENCOUNTER
Message noted: Chart reviewed and may refill medication as requested. Script sent to listed pharmacy by secure method.    Pt notified through [employer]

## 2025-02-11 DIAGNOSIS — F98.8 ATTENTION DEFICIT DISORDER, UNSPECIFIED TYPE: ICD-10-CM

## 2025-02-11 RX ORDER — DEXTROAMPHETAMINE SACCHARATE, AMPHETAMINE ASPARTATE, DEXTROAMPHETAMINE SULFATE AND AMPHETAMINE SULFATE 7.5; 7.5; 7.5; 7.5 MG/1; MG/1; MG/1; MG/1
30 TABLET ORAL DAILY
Qty: 30 TABLET | Refills: 0 | Status: SHIPPED | OUTPATIENT
Start: 2025-02-11

## 2025-02-11 NOTE — TELEPHONE ENCOUNTER
Message noted: Chart reviewed and may refill medication as requested. Script sent to listed pharmacy by secure method.    Pt notified through [employer]

## 2025-03-14 DIAGNOSIS — F98.8 ATTENTION DEFICIT DISORDER, UNSPECIFIED TYPE: ICD-10-CM

## 2025-03-14 RX ORDER — DEXTROAMPHETAMINE SACCHARATE, AMPHETAMINE ASPARTATE, DEXTROAMPHETAMINE SULFATE AND AMPHETAMINE SULFATE 7.5; 7.5; 7.5; 7.5 MG/1; MG/1; MG/1; MG/1
30 TABLET ORAL DAILY
Qty: 30 TABLET | Refills: 0 | Status: SHIPPED | OUTPATIENT
Start: 2025-03-14

## 2025-03-14 NOTE — TELEPHONE ENCOUNTER
Message noted: Chart reviewed and may refill medication as requested times one. Prescription sent to listed pharmacy. Pharmacy to notify patient to make appointment for further refills  Pt notified through [employer] also.

## 2025-03-27 ENCOUNTER — OFFICE VISIT (OUTPATIENT)
Dept: FAMILY MEDICINE CLINIC | Facility: CLINIC | Age: 50
End: 2025-03-27
Payer: COMMERCIAL

## 2025-03-27 VITALS
HEIGHT: 70 IN | HEART RATE: 88 BPM | DIASTOLIC BLOOD PRESSURE: 80 MMHG | SYSTOLIC BLOOD PRESSURE: 138 MMHG | WEIGHT: 215 LBS | RESPIRATION RATE: 22 BRPM | TEMPERATURE: 98 F | BODY MASS INDEX: 30.78 KG/M2

## 2025-03-27 DIAGNOSIS — I10 ESSENTIAL HYPERTENSION: ICD-10-CM

## 2025-03-27 DIAGNOSIS — Z00.00 ROUTINE PHYSICAL EXAMINATION: Primary | ICD-10-CM

## 2025-03-27 DIAGNOSIS — Z12.11 COLON CANCER SCREENING: ICD-10-CM

## 2025-03-27 DIAGNOSIS — F41.1 ANXIETY STATE: ICD-10-CM

## 2025-03-27 DIAGNOSIS — F98.8 ATTENTION DEFICIT DISORDER, UNSPECIFIED TYPE: ICD-10-CM

## 2025-03-27 NOTE — PROGRESS NOTES
Subjective:   Patient ID: Ady Sanches is a 50 year old male.    Patient is here for routine physical exam and follow up on chronic medical issues. No acute issues. Patient is requesting blood testing. Diet and exercise have been good. Past medical history, family history, and social history were reviewed. Started a new job and doing well.   Family hx of breast cancer/ prostate cancer.   The patient is compliant with medications and no side effects. The patient states medications have been helpful and effective.  There are no acute issues and patient states he does not need refills.   ROS unremarkable.          History/Other:   Review of Systems   Constitutional: Negative.    HENT: Negative.     Eyes: Negative.    Respiratory: Negative.  Negative for cough and shortness of breath.    Cardiovascular: Negative.  Negative for chest pain.   Gastrointestinal: Negative.  Negative for abdominal pain and blood in stool.   Endocrine: Negative.    Genitourinary: Negative.  Negative for dysuria.   Musculoskeletal: Negative.    Skin: Negative.    Allergic/Immunologic: Negative.    Neurological: Negative.  Negative for dizziness and headaches.   Hematological: Negative.    Psychiatric/Behavioral: Negative.  Negative for decreased concentration and dysphoric mood. The patient is not nervous/anxious (stable).      Current Outpatient Medications   Medication Sig Dispense Refill    amphetamine-dextroamphetamine 30 MG Oral Tab Take 1 tablet (30 mg total) by mouth daily. 30 tablet 0    amphetamine-dextroamphetamine 30 MG Oral Tab Take 1 tablet (30 mg total) by mouth daily. 30 tablet 0    PARoxetine 20 MG Oral Tab Take 1 tablet (20 mg total) by mouth daily. 90 tablet 3    Benazepril-hydroCHLOROthiazide 20-12.5 MG Oral Tab Take 1 tablet by mouth daily. 90 tablet 3     Allergies:Allergies[1]    Objective:   Physical Exam  Constitutional:       Appearance: Normal appearance. He is well-developed.   HENT:      Head: Normocephalic.       Right Ear: Tympanic membrane, ear canal and external ear normal.      Left Ear: Tympanic membrane, ear canal and external ear normal.      Nose: Nose normal.      Mouth/Throat:      Mouth: Mucous membranes are moist.      Pharynx: No oropharyngeal exudate or posterior oropharyngeal erythema.   Eyes:      Conjunctiva/sclera: Conjunctivae normal.   Cardiovascular:      Rate and Rhythm: Normal rate and regular rhythm.      Pulses: Normal pulses.      Heart sounds: Normal heart sounds.   Pulmonary:      Effort: Pulmonary effort is normal. No respiratory distress.      Breath sounds: Normal breath sounds. No wheezing or rales.   Abdominal:      General: Abdomen is flat. There is no distension.      Palpations: Abdomen is soft. There is no mass.      Tenderness: There is no abdominal tenderness.   Musculoskeletal:         General: Normal range of motion.      Cervical back: Normal range of motion and neck supple.   Skin:     General: Skin is warm.   Neurological:      General: No focal deficit present.      Mental Status: He is alert and oriented to person, place, and time.      Sensory: No sensory deficit.      Deep Tendon Reflexes: Reflexes are normal and symmetric. Reflexes normal.   Psychiatric:         Mood and Affect: Mood normal.         Behavior: Behavior normal.         Assessment & Plan:   1. Routine physical examination:  - Exam is unremarkable. Screening tests were discussed, and after discussion, will check lab work as below. Healthy diet, exercise, and weight were discussed. To call if problems and follow up and further management after testing. Routine follow up.     2. Colon cancer screening:  - After discussion, FIT testing was ordered, Follow up and further management after testing     3. Essential hypertension:  - Stable, Will check lab work as below; Medication reviewed. Follow up and further management after testing. To monitor blood pressure; To call if any persistent elevation of blood pressure;  Discussed good diet/activity; Routine follow up in 6-12 months or as needed.       4. Attention deficit disorder, unspecified type:  - Stable: medication reviewed; To continue present treatment; To call if problems; Routine follow up in 6-12 months.      5. Anxiety state:  - Stable: medication reviewed; To continue present treatment; To call if problems; Routine follow up in 6-12 months.         Orders Placed This Encounter   Procedures    Lipid Panel    PSA Total, Screen    CBC, Platelet; No Differential    Comp Metabolic Panel (14)    Occult Blood, Fecal, FIT Immunoassay       Meds This Visit:  Requested Prescriptions      No prescriptions requested or ordered in this encounter       Imaging & Referrals:  None         [1] No Known Allergies

## 2025-04-10 DIAGNOSIS — F98.8 ATTENTION DEFICIT DISORDER, UNSPECIFIED TYPE: ICD-10-CM

## 2025-04-10 RX ORDER — DEXTROAMPHETAMINE SACCHARATE, AMPHETAMINE ASPARTATE, DEXTROAMPHETAMINE SULFATE AND AMPHETAMINE SULFATE 7.5; 7.5; 7.5; 7.5 MG/1; MG/1; MG/1; MG/1
30 TABLET ORAL DAILY
Qty: 30 TABLET | Refills: 0 | Status: SHIPPED | OUTPATIENT
Start: 2025-04-10

## 2025-04-10 NOTE — TELEPHONE ENCOUNTER
Message noted: Chart reviewed and may refill medication as requested. Script sent to listed pharmacy by secure method.    Pt notified through [employer]

## 2025-05-12 RX ORDER — BENAZEPRIL HYDROCHLORIDE AND HYDROCHLOROTHIAZIDE 20; 12.5 MG/1; MG/1
1 TABLET ORAL DAILY
Qty: 90 TABLET | Refills: 3 | Status: SHIPPED | OUTPATIENT
Start: 2025-05-12

## 2025-05-12 NOTE — TELEPHONE ENCOUNTER
Message noted: Chart reviewed and may refill medication as requested. Prescription sent to listed pharmacy. Pharmacy to notify patient. Pt notified through [employer]

## 2025-05-13 DIAGNOSIS — F98.8 ATTENTION DEFICIT DISORDER, UNSPECIFIED TYPE: ICD-10-CM

## 2025-05-13 NOTE — TELEPHONE ENCOUNTER
Please review. Protocol Failed; No Protocol      Recent fills: 3/17/2025, 4/16/2025  Last Rx written: 4/10/2025  Last office visit: 3/27/2025

## 2025-05-14 RX ORDER — DEXTROAMPHETAMINE SACCHARATE, AMPHETAMINE ASPARTATE, DEXTROAMPHETAMINE SULFATE AND AMPHETAMINE SULFATE 7.5; 7.5; 7.5; 7.5 MG/1; MG/1; MG/1; MG/1
30 TABLET ORAL DAILY
Qty: 30 TABLET | Refills: 0 | Status: SHIPPED | OUTPATIENT
Start: 2025-05-14 | End: 2025-05-15

## 2025-05-14 NOTE — TELEPHONE ENCOUNTER
Message noted: Chart reviewed and may refill medication as requested. Script sent to listed pharmacy by secure method.    Pt notified through [employer]

## 2025-05-15 DIAGNOSIS — F98.8 ATTENTION DEFICIT DISORDER, UNSPECIFIED TYPE: ICD-10-CM

## 2025-05-16 RX ORDER — DEXTROAMPHETAMINE SACCHARATE, AMPHETAMINE ASPARTATE, DEXTROAMPHETAMINE SULFATE AND AMPHETAMINE SULFATE 7.5; 7.5; 7.5; 7.5 MG/1; MG/1; MG/1; MG/1
30 TABLET ORAL DAILY
Qty: 30 TABLET | Refills: 0 | Status: SHIPPED | OUTPATIENT
Start: 2025-05-16 | End: 2025-06-11

## 2025-06-11 DIAGNOSIS — F98.8 ATTENTION DEFICIT DISORDER, UNSPECIFIED TYPE: ICD-10-CM

## 2025-06-12 RX ORDER — DEXTROAMPHETAMINE SACCHARATE, AMPHETAMINE ASPARTATE, DEXTROAMPHETAMINE SULFATE AND AMPHETAMINE SULFATE 7.5; 7.5; 7.5; 7.5 MG/1; MG/1; MG/1; MG/1
30 TABLET ORAL DAILY
Qty: 30 TABLET | Refills: 0 | Status: SHIPPED | OUTPATIENT
Start: 2025-06-12

## 2025-06-12 NOTE — TELEPHONE ENCOUNTER
Please review; protocol failed/ has no protocol        Recent fills: 05/16/2025,04/16/2025,03/17/2025  Last Rx written: 05/16/2025  Last Office Visit: 03/27/2025    Recent Visits  Date Type Provider Dept   03/27/25 Office Visit Butch Ojeda MD [facility]

## 2025-06-12 NOTE — TELEPHONE ENCOUNTER
Message noted: Chart reviewed and may refill medication as requested. Script sent to listed pharmacy by secure method.    Pt notified through [employer]

## 2025-07-08 DIAGNOSIS — F98.8 ATTENTION DEFICIT DISORDER, UNSPECIFIED TYPE: ICD-10-CM

## 2025-07-08 RX ORDER — DEXTROAMPHETAMINE SACCHARATE, AMPHETAMINE ASPARTATE, DEXTROAMPHETAMINE SULFATE AND AMPHETAMINE SULFATE 7.5; 7.5; 7.5; 7.5 MG/1; MG/1; MG/1; MG/1
30 TABLET ORAL DAILY
Qty: 30 TABLET | Refills: 0 | Status: SHIPPED | OUTPATIENT
Start: 2025-07-08

## 2025-07-08 NOTE — TELEPHONE ENCOUNTER
Message noted: Chart reviewed and may refill medication as requested. Script sent to listed pharmacy by secure method.    Pt notified through [employer]

## 2025-08-07 DIAGNOSIS — F98.8 ATTENTION DEFICIT DISORDER, UNSPECIFIED TYPE: ICD-10-CM

## 2025-08-08 RX ORDER — DEXTROAMPHETAMINE SACCHARATE, AMPHETAMINE ASPARTATE, DEXTROAMPHETAMINE SULFATE AND AMPHETAMINE SULFATE 7.5; 7.5; 7.5; 7.5 MG/1; MG/1; MG/1; MG/1
30 TABLET ORAL DAILY
Qty: 30 TABLET | Refills: 0 | Status: SHIPPED | OUTPATIENT
Start: 2025-08-08

## (undated) NOTE — LETTER
Jae Kam  [address]  [address] [facility] [address]      August 27, 2018        Dear Prince Zhu:      On behalf of your primary doctor Bernie Marcial MD, we have made multiple attempts to reach you by phone over the past several weeks.   Unfortunately,

## (undated) NOTE — LETTER
10/15/18        [address] [facility]      Dear Andreea Peng,    [address] [facility] records indicate that you have outstanding lab work and or testing that was ordered for you and has not yet been completed:  Orders Placed This Encounter      Lip

## (undated) NOTE — LETTER
11/4/2020              [address]         Dear Obie Castellanos records indicate that the tests ordered for you by Jessika Hair MD  have not been done.   If you have, in fact, already completed the te

## (undated) NOTE — LETTER
12/05/17        [address] [facility]      Dear Patricia Felix,    [address] [facility] records indicate that you have outstanding lab work and or testing that was ordered for you and has not yet been completed:          Lipid Panel [E]      Comp Met

## (undated) NOTE — LETTER
8/28/2018              [address] [facility]         Dear Jane Costa,    This letter is to inform you that our office has made several attempts to reach you by phone without success.   We were attempting to contact you

## (undated) NOTE — LETTER
03/20/19        Valentin Sánchez  [address]      Dear Sarita Hoang records indicate that you have outstanding lab work and or testing that was ordered for you and has not yet been completed:  Orders Placed This Encounter

## (undated) NOTE — LETTER
02/08/20        Ty Marcus  [address]      Dear Viktoria Gomes records indicate that you have outstanding lab work and or testing that was ordered for you and has not yet been completed:  Orders Placed This Encounter

## (undated) NOTE — MR AVS SNAPSHOT
[facility] [place] [address] [address] [id number] [phone]               Thank you for choosing us for your health care visit with Diamond Nicole MD.  We are glad to serve you and happy to provide you with this summary of y Take 1 tablet by mouth once daily. What changed:  See the new instructions. Commonly known as:  LOTENSIN HCT           PARoxetine HCl 10 MG Tabs   TAKE 1 TABLET BY MOUTH EVERY MORNING   What changed:  See the new instructions.    Commonly known as:  PAX Eat plenty of low-fat dairy products High fat meats and dairy   Choose whole grain products Foods high in sodium   Water is best for hydration Fast food.    Eat at home when possible     Tips for increasing your physical activity – Adults who are physically

## (undated) NOTE — LETTER
12/20/18        [address] [facility]      Dear Tanvir Haider,    [address] [facility] records indicate that you have outstanding lab work and or testing that was ordered for you and has not yet been completed:  Orders Placed This Encounter      Lip